# Patient Record
Sex: FEMALE | Race: WHITE | NOT HISPANIC OR LATINO | URBAN - METROPOLITAN AREA
[De-identification: names, ages, dates, MRNs, and addresses within clinical notes are randomized per-mention and may not be internally consistent; named-entity substitution may affect disease eponyms.]

---

## 2018-02-05 ENCOUNTER — INPATIENT (INPATIENT)
Facility: HOSPITAL | Age: 68
LOS: 6 days | Discharge: HOME | DRG: 193 | End: 2018-02-12
Attending: HOSPITALIST | Admitting: HOSPITALIST
Payer: MEDICARE

## 2018-02-05 VITALS
HEIGHT: 68 IN | HEART RATE: 72 BPM | TEMPERATURE: 98 F | WEIGHT: 253.97 LBS | DIASTOLIC BLOOD PRESSURE: 84 MMHG | RESPIRATION RATE: 22 BRPM | SYSTOLIC BLOOD PRESSURE: 133 MMHG | OXYGEN SATURATION: 89 %

## 2018-02-05 DIAGNOSIS — S72.101D UNSPECIFIED TROCHANTERIC FRACTURE OF RIGHT FEMUR, SUBSEQUENT ENCOUNTER FOR CLOSED FRACTURE WITH ROUTINE HEALING: Chronic | ICD-10-CM

## 2018-02-05 DIAGNOSIS — R09.02 HYPOXEMIA: ICD-10-CM

## 2018-02-05 LAB
ALBUMIN SERPL ELPH-MCNC: 4.1 G/DL — SIGNIFICANT CHANGE UP (ref 3.3–5.2)
ALP SERPL-CCNC: 72 U/L — SIGNIFICANT CHANGE UP (ref 40–120)
ALT FLD-CCNC: 31 U/L — SIGNIFICANT CHANGE UP
ANION GAP SERPL CALC-SCNC: 13 MMOL/L — SIGNIFICANT CHANGE UP (ref 5–17)
AST SERPL-CCNC: 25 U/L — SIGNIFICANT CHANGE UP
BASE EXCESS BLDA CALC-SCNC: 7.7 MMOL/L — HIGH (ref -2–2)
BASOPHILS # BLD AUTO: 0 K/UL — SIGNIFICANT CHANGE UP (ref 0–0.2)
BASOPHILS NFR BLD AUTO: 0.1 % — SIGNIFICANT CHANGE UP (ref 0–2)
BILIRUB SERPL-MCNC: 0.3 MG/DL — LOW (ref 0.4–2)
BUN SERPL-MCNC: 8 MG/DL — SIGNIFICANT CHANGE UP (ref 8–20)
CALCIUM SERPL-MCNC: 9.2 MG/DL — SIGNIFICANT CHANGE UP (ref 8.6–10.2)
CHLORIDE SERPL-SCNC: 91 MMOL/L — LOW (ref 98–107)
CO2 SERPL-SCNC: 32 MMOL/L — HIGH (ref 22–29)
CREAT SERPL-MCNC: 0.51 MG/DL — SIGNIFICANT CHANGE UP (ref 0.5–1.3)
D DIMER BLD IA.RAPID-MCNC: 237 NG/ML DDU — HIGH
EOSINOPHIL # BLD AUTO: 0 K/UL — SIGNIFICANT CHANGE UP (ref 0–0.5)
EOSINOPHIL NFR BLD AUTO: 0.1 % — SIGNIFICANT CHANGE UP (ref 0–6)
FLUBV RNA SPEC QL NAA+PROBE: DETECTED
GAS PNL BLDA: SIGNIFICANT CHANGE UP
GLUCOSE SERPL-MCNC: 204 MG/DL — HIGH (ref 70–115)
HCO3 BLDA-SCNC: 31 MMOL/L — HIGH (ref 20–26)
HCT VFR BLD CALC: 46.9 % — SIGNIFICANT CHANGE UP (ref 37–47)
HGB BLD-MCNC: 15 G/DL — SIGNIFICANT CHANGE UP (ref 12–16)
HOROWITZ INDEX BLDA+IHG-RTO: SIGNIFICANT CHANGE UP
LYMPHOCYTES # BLD AUTO: 1.1 K/UL — SIGNIFICANT CHANGE UP (ref 1–4.8)
LYMPHOCYTES # BLD AUTO: 13.9 % — LOW (ref 20–55)
MCHC RBC-ENTMCNC: 27.5 PG — SIGNIFICANT CHANGE UP (ref 27–31)
MCHC RBC-ENTMCNC: 32 G/DL — SIGNIFICANT CHANGE UP (ref 32–36)
MCV RBC AUTO: 85.9 FL — SIGNIFICANT CHANGE UP (ref 81–99)
MONOCYTES # BLD AUTO: 0.6 K/UL — SIGNIFICANT CHANGE UP (ref 0–0.8)
MONOCYTES NFR BLD AUTO: 7.6 % — SIGNIFICANT CHANGE UP (ref 3–10)
NEUTROPHILS # BLD AUTO: 6.3 K/UL — SIGNIFICANT CHANGE UP (ref 1.8–8)
NEUTROPHILS NFR BLD AUTO: 78.2 % — HIGH (ref 37–73)
NT-PROBNP SERPL-SCNC: 562 PG/ML — HIGH (ref 0–300)
PCO2 BLDA: 48 MMHG — HIGH (ref 35–45)
PH BLDA: 7.45 — SIGNIFICANT CHANGE UP (ref 7.35–7.45)
PLATELET # BLD AUTO: 229 K/UL — SIGNIFICANT CHANGE UP (ref 150–400)
PO2 BLDA: 64 MMHG — LOW (ref 83–108)
POTASSIUM SERPL-MCNC: 3.9 MMOL/L — SIGNIFICANT CHANGE UP (ref 3.5–5.3)
POTASSIUM SERPL-SCNC: 3.9 MMOL/L — SIGNIFICANT CHANGE UP (ref 3.5–5.3)
PROT SERPL-MCNC: 8 G/DL — SIGNIFICANT CHANGE UP (ref 6.6–8.7)
RAPID RVP RESULT: DETECTED
RBC # BLD: 5.46 M/UL — HIGH (ref 4.4–5.2)
RBC # FLD: 14.1 % — SIGNIFICANT CHANGE UP (ref 11–15.6)
SAO2 % BLDA: 94 % — LOW (ref 95–99)
SODIUM SERPL-SCNC: 136 MMOL/L — SIGNIFICANT CHANGE UP (ref 135–145)
TROPONIN T SERPL-MCNC: <0.01 NG/ML — SIGNIFICANT CHANGE UP (ref 0–0.06)
WBC # BLD: 8 K/UL — SIGNIFICANT CHANGE UP (ref 4.8–10.8)
WBC # FLD AUTO: 8 K/UL — SIGNIFICANT CHANGE UP (ref 4.8–10.8)

## 2018-02-05 PROCEDURE — 71275 CT ANGIOGRAPHY CHEST: CPT | Mod: 26

## 2018-02-05 PROCEDURE — 99285 EMERGENCY DEPT VISIT HI MDM: CPT

## 2018-02-05 PROCEDURE — 99223 1ST HOSP IP/OBS HIGH 75: CPT

## 2018-02-05 RX ORDER — AZITHROMYCIN 500 MG/1
TABLET, FILM COATED ORAL
Qty: 0 | Refills: 0 | Status: DISCONTINUED | OUTPATIENT
Start: 2018-02-05 | End: 2018-02-07

## 2018-02-05 RX ORDER — IPRATROPIUM/ALBUTEROL SULFATE 18-103MCG
3 AEROSOL WITH ADAPTER (GRAM) INHALATION ONCE
Qty: 0 | Refills: 0 | Status: COMPLETED | OUTPATIENT
Start: 2018-02-05 | End: 2018-02-05

## 2018-02-05 RX ORDER — LOSARTAN POTASSIUM 100 MG/1
100 TABLET, FILM COATED ORAL DAILY
Qty: 0 | Refills: 0 | Status: DISCONTINUED | OUTPATIENT
Start: 2018-02-05 | End: 2018-02-12

## 2018-02-05 RX ORDER — INSULIN LISPRO 100/ML
VIAL (ML) SUBCUTANEOUS
Qty: 0 | Refills: 0 | Status: DISCONTINUED | OUTPATIENT
Start: 2018-02-05 | End: 2018-02-05

## 2018-02-05 RX ORDER — GLUCAGON INJECTION, SOLUTION 0.5 MG/.1ML
1 INJECTION, SOLUTION SUBCUTANEOUS ONCE
Qty: 0 | Refills: 0 | Status: DISCONTINUED | OUTPATIENT
Start: 2018-02-05 | End: 2018-02-12

## 2018-02-05 RX ORDER — INSULIN DETEMIR 100/ML (3)
62 INSULIN PEN (ML) SUBCUTANEOUS
Qty: 0 | Refills: 0 | Status: DISCONTINUED | OUTPATIENT
Start: 2018-02-05 | End: 2018-02-07

## 2018-02-05 RX ORDER — FLUOXETINE HCL 10 MG
60 CAPSULE ORAL DAILY
Qty: 0 | Refills: 0 | Status: DISCONTINUED | OUTPATIENT
Start: 2018-02-05 | End: 2018-02-12

## 2018-02-05 RX ORDER — ATENOLOL 25 MG/1
0 TABLET ORAL
Qty: 0 | Refills: 0 | COMMUNITY

## 2018-02-05 RX ORDER — ZOLPIDEM TARTRATE 10 MG/1
10 TABLET ORAL AT BEDTIME
Qty: 0 | Refills: 0 | Status: DISCONTINUED | OUTPATIENT
Start: 2018-02-05 | End: 2018-02-11

## 2018-02-05 RX ORDER — AZITHROMYCIN 500 MG/1
500 TABLET, FILM COATED ORAL ONCE
Qty: 0 | Refills: 0 | Status: COMPLETED | OUTPATIENT
Start: 2018-02-05 | End: 2018-02-05

## 2018-02-05 RX ORDER — RANITIDINE HYDROCHLORIDE 150 MG/1
0 TABLET, FILM COATED ORAL
Qty: 0 | Refills: 0 | COMMUNITY

## 2018-02-05 RX ORDER — DEXTROSE 50 % IN WATER 50 %
25 SYRINGE (ML) INTRAVENOUS ONCE
Qty: 0 | Refills: 0 | Status: DISCONTINUED | OUTPATIENT
Start: 2018-02-05 | End: 2018-02-12

## 2018-02-05 RX ORDER — DEXTROSE 50 % IN WATER 50 %
1 SYRINGE (ML) INTRAVENOUS ONCE
Qty: 0 | Refills: 0 | Status: DISCONTINUED | OUTPATIENT
Start: 2018-02-05 | End: 2018-02-12

## 2018-02-05 RX ORDER — ALBUTEROL 90 UG/1
2.5 AEROSOL, METERED ORAL ONCE
Qty: 0 | Refills: 0 | Status: COMPLETED | OUTPATIENT
Start: 2018-02-05 | End: 2018-02-05

## 2018-02-05 RX ORDER — HYDROCHLOROTHIAZIDE 25 MG
25 TABLET ORAL DAILY
Qty: 0 | Refills: 0 | Status: DISCONTINUED | OUTPATIENT
Start: 2018-02-05 | End: 2018-02-06

## 2018-02-05 RX ORDER — SIMVASTATIN 20 MG/1
20 TABLET, FILM COATED ORAL AT BEDTIME
Qty: 0 | Refills: 0 | Status: DISCONTINUED | OUTPATIENT
Start: 2018-02-05 | End: 2018-02-12

## 2018-02-05 RX ORDER — ALPRAZOLAM 0.25 MG
1 TABLET ORAL
Qty: 0 | Refills: 0 | Status: DISCONTINUED | OUTPATIENT
Start: 2018-02-05 | End: 2018-02-06

## 2018-02-05 RX ORDER — DEXTROSE 50 % IN WATER 50 %
12.5 SYRINGE (ML) INTRAVENOUS ONCE
Qty: 0 | Refills: 0 | Status: DISCONTINUED | OUTPATIENT
Start: 2018-02-05 | End: 2018-02-12

## 2018-02-05 RX ORDER — CARVEDILOL PHOSPHATE 80 MG/1
6.25 CAPSULE, EXTENDED RELEASE ORAL EVERY 12 HOURS
Qty: 0 | Refills: 0 | Status: DISCONTINUED | OUTPATIENT
Start: 2018-02-05 | End: 2018-02-10

## 2018-02-05 RX ORDER — AZITHROMYCIN 500 MG/1
250 TABLET, FILM COATED ORAL EVERY 24 HOURS
Qty: 0 | Refills: 0 | Status: DISCONTINUED | OUTPATIENT
Start: 2018-02-06 | End: 2018-02-07

## 2018-02-05 RX ORDER — ACETAMINOPHEN 500 MG
650 TABLET ORAL EVERY 6 HOURS
Qty: 0 | Refills: 0 | Status: DISCONTINUED | OUTPATIENT
Start: 2018-02-05 | End: 2018-02-12

## 2018-02-05 RX ORDER — SODIUM CHLORIDE 9 MG/ML
1000 INJECTION INTRAMUSCULAR; INTRAVENOUS; SUBCUTANEOUS
Qty: 0 | Refills: 0 | Status: DISCONTINUED | OUTPATIENT
Start: 2018-02-05 | End: 2018-02-07

## 2018-02-05 RX ORDER — AMLODIPINE BESYLATE 2.5 MG/1
5 TABLET ORAL DAILY
Qty: 0 | Refills: 0 | Status: DISCONTINUED | OUTPATIENT
Start: 2018-02-05 | End: 2018-02-08

## 2018-02-05 RX ORDER — IPRATROPIUM/ALBUTEROL SULFATE 18-103MCG
3 AEROSOL WITH ADAPTER (GRAM) INHALATION EVERY 6 HOURS
Qty: 0 | Refills: 0 | Status: DISCONTINUED | OUTPATIENT
Start: 2018-02-05 | End: 2018-02-07

## 2018-02-05 RX ORDER — SODIUM CHLORIDE 9 MG/ML
1000 INJECTION, SOLUTION INTRAVENOUS
Qty: 0 | Refills: 0 | Status: DISCONTINUED | OUTPATIENT
Start: 2018-02-05 | End: 2018-02-12

## 2018-02-05 RX ADMIN — Medication 3 MILLILITER(S): at 14:40

## 2018-02-05 RX ADMIN — AZITHROMYCIN 500 MILLIGRAM(S): 500 TABLET, FILM COATED ORAL at 21:50

## 2018-02-05 RX ADMIN — ALBUTEROL 2.5 MILLIGRAM(S): 90 AEROSOL, METERED ORAL at 14:49

## 2018-02-05 RX ADMIN — Medication 125 MILLIGRAM(S): at 14:48

## 2018-02-05 NOTE — ED ADULT NURSE NOTE - OBJECTIVE STATEMENT
Late entry : Pt presents  to ED c/o increased SOB over couple of days, denies chest pain , denies fever. Pt was send from PMD for further eval

## 2018-02-05 NOTE — H&P ADULT - PROBLEM SELECTOR PLAN 1
with hypoxia due to COPD exacerbation  Admit to any bed with   Nebs q 6 hrs  EKG  troponin 0.01  RVP+ for influenza b  solumedrol 125 IV then 40 q 6 with plan for taper  NC prn SOB for goal >92%  cbc, cmp, mg, phos, coags for AM  ABG reviewed

## 2018-02-05 NOTE — ED PROVIDER NOTE - OBJECTIVE STATEMENT
66 yo obese female smoker comes to ed with low oxygen saturation 88- 89 on ra; pt sent for further work up ;
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
## 2018-02-05 NOTE — H&P ADULT - ASSESSMENT
68 yo female with a pmh/o HTN, HLD, who is a current smoker who is admitted for respiratory distress with hypoxia due to COPD exacerbation likely caused by Influenza B infection

## 2018-02-05 NOTE — ED ADULT NURSE NOTE - CHIEF COMPLAINT QUOTE
pt c/o shortness of breath with cough congestion, md sent over for low pulse ox (89) pt wheezing, sent  to main

## 2018-02-05 NOTE — H&P ADULT - NSHPSOCIALHISTORY_GEN_ALL_CORE
, works in dance studio as volunteer, smoker, no etoh/drug use  lives with daughter and grand daughter

## 2018-02-05 NOTE — H&P ADULT - PMH
DM (diabetes mellitus)    High cholesterol    HTN (hypertension) DM (diabetes mellitus)    High cholesterol    HTN (hypertension)    Smoker

## 2018-02-05 NOTE — H&P ADULT - PROBLEM SELECTOR PLAN 3
paratracheal in setting of smoking history  likely reactive to acute infection however will need to f/u with yearly low dose CT scan with PMD
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
## 2018-02-05 NOTE — ED ADULT TRIAGE NOTE - CHIEF COMPLAINT QUOTE
pt c/o shortness of breath with cough congestion, md sent over for low pulse ox (89) pt wheezing pt c/o shortness of breath with cough congestion, md sent over for low pulse ox (89) pt wheezing, sent  to main

## 2018-02-06 DIAGNOSIS — J11.1 INFLUENZA DUE TO UNIDENTIFIED INFLUENZA VIRUS WITH OTHER RESPIRATORY MANIFESTATIONS: ICD-10-CM

## 2018-02-06 DIAGNOSIS — I10 ESSENTIAL (PRIMARY) HYPERTENSION: ICD-10-CM

## 2018-02-06 DIAGNOSIS — J44.1 CHRONIC OBSTRUCTIVE PULMONARY DISEASE WITH (ACUTE) EXACERBATION: ICD-10-CM

## 2018-02-06 DIAGNOSIS — J10.1 INFLUENZA DUE TO OTHER IDENTIFIED INFLUENZA VIRUS WITH OTHER RESPIRATORY MANIFESTATIONS: ICD-10-CM

## 2018-02-06 DIAGNOSIS — E78.00 PURE HYPERCHOLESTEROLEMIA, UNSPECIFIED: ICD-10-CM

## 2018-02-06 DIAGNOSIS — F17.200 NICOTINE DEPENDENCE, UNSPECIFIED, UNCOMPLICATED: ICD-10-CM

## 2018-02-06 DIAGNOSIS — R06.03 ACUTE RESPIRATORY DISTRESS: ICD-10-CM

## 2018-02-06 DIAGNOSIS — R59.1 GENERALIZED ENLARGED LYMPH NODES: ICD-10-CM

## 2018-02-06 DIAGNOSIS — E11.9 TYPE 2 DIABETES MELLITUS WITHOUT COMPLICATIONS: ICD-10-CM

## 2018-02-06 LAB
ALBUMIN SERPL ELPH-MCNC: 3.9 G/DL — SIGNIFICANT CHANGE UP (ref 3.3–5.2)
ALP SERPL-CCNC: 61 U/L — SIGNIFICANT CHANGE UP (ref 40–120)
ALT FLD-CCNC: 27 U/L — SIGNIFICANT CHANGE UP
ANION GAP SERPL CALC-SCNC: 15 MMOL/L — SIGNIFICANT CHANGE UP (ref 5–17)
APPEARANCE UR: CLEAR — SIGNIFICANT CHANGE UP
APTT BLD: 30.2 SEC — SIGNIFICANT CHANGE UP (ref 27.5–37.4)
AST SERPL-CCNC: 17 U/L — SIGNIFICANT CHANGE UP
BILIRUB SERPL-MCNC: 0.3 MG/DL — LOW (ref 0.4–2)
BILIRUB UR-MCNC: NEGATIVE — SIGNIFICANT CHANGE UP
BUN SERPL-MCNC: 12 MG/DL — SIGNIFICANT CHANGE UP (ref 8–20)
CALCIUM SERPL-MCNC: 8.9 MG/DL — SIGNIFICANT CHANGE UP (ref 8.6–10.2)
CHLORIDE SERPL-SCNC: 94 MMOL/L — LOW (ref 98–107)
CO2 SERPL-SCNC: 30 MMOL/L — HIGH (ref 22–29)
COLOR SPEC: YELLOW — SIGNIFICANT CHANGE UP
CREAT SERPL-MCNC: 0.5 MG/DL — SIGNIFICANT CHANGE UP (ref 0.5–1.3)
DIFF PNL FLD: ABNORMAL
EPI CELLS # UR: SIGNIFICANT CHANGE UP
GLUCOSE BLDC GLUCOMTR-MCNC: 250 MG/DL — HIGH (ref 70–99)
GLUCOSE BLDC GLUCOMTR-MCNC: 264 MG/DL — HIGH (ref 70–99)
GLUCOSE BLDC GLUCOMTR-MCNC: 283 MG/DL — HIGH (ref 70–99)
GLUCOSE BLDC GLUCOMTR-MCNC: 342 MG/DL — HIGH (ref 70–99)
GLUCOSE BLDC GLUCOMTR-MCNC: 348 MG/DL — HIGH (ref 70–99)
GLUCOSE BLDC GLUCOMTR-MCNC: 382 MG/DL — HIGH (ref 70–99)
GLUCOSE SERPL-MCNC: 294 MG/DL — HIGH (ref 70–115)
GLUCOSE UR QL: 1000 MG/DL
HBA1C BLD-MCNC: 9.4 % — HIGH (ref 4–5.6)
HCT VFR BLD CALC: 45.6 % — SIGNIFICANT CHANGE UP (ref 37–47)
HGB BLD-MCNC: 14.3 G/DL — SIGNIFICANT CHANGE UP (ref 12–16)
INR BLD: 0.98 RATIO — SIGNIFICANT CHANGE UP (ref 0.88–1.16)
KETONES UR-MCNC: ABNORMAL
LEUKOCYTE ESTERASE UR-ACNC: NEGATIVE — SIGNIFICANT CHANGE UP
LYMPHOCYTES # BLD AUTO: 0.6 K/UL — LOW (ref 1–4.8)
LYMPHOCYTES # BLD AUTO: 7 % — LOW (ref 20–55)
MAGNESIUM SERPL-MCNC: 2.1 MG/DL — SIGNIFICANT CHANGE UP (ref 1.6–2.6)
MCHC RBC-ENTMCNC: 27.2 PG — SIGNIFICANT CHANGE UP (ref 27–31)
MCHC RBC-ENTMCNC: 31.4 G/DL — LOW (ref 32–36)
MCV RBC AUTO: 86.7 FL — SIGNIFICANT CHANGE UP (ref 81–99)
MONOCYTES # BLD AUTO: 0.5 K/UL — SIGNIFICANT CHANGE UP (ref 0–0.8)
MONOCYTES NFR BLD AUTO: 6 % — SIGNIFICANT CHANGE UP (ref 3–10)
NEUTROPHILS # BLD AUTO: 7.8 K/UL — SIGNIFICANT CHANGE UP (ref 1.8–8)
NEUTROPHILS NFR BLD AUTO: 83 % — HIGH (ref 37–73)
NEUTS BAND # BLD: 4 % — SIGNIFICANT CHANGE UP (ref 0–8)
NITRITE UR-MCNC: NEGATIVE — SIGNIFICANT CHANGE UP
PH UR: 6 — SIGNIFICANT CHANGE UP (ref 5–8)
PHOSPHATE SERPL-MCNC: 2.2 MG/DL — LOW (ref 2.4–4.7)
PLAT MORPH BLD: NORMAL — SIGNIFICANT CHANGE UP
PLATELET # BLD AUTO: 216 K/UL — SIGNIFICANT CHANGE UP (ref 150–400)
POTASSIUM SERPL-MCNC: 4 MMOL/L — SIGNIFICANT CHANGE UP (ref 3.5–5.3)
POTASSIUM SERPL-SCNC: 4 MMOL/L — SIGNIFICANT CHANGE UP (ref 3.5–5.3)
PROT SERPL-MCNC: 7.4 G/DL — SIGNIFICANT CHANGE UP (ref 6.6–8.7)
PROT UR-MCNC: 100 MG/DL
PROTHROM AB SERPL-ACNC: 10.8 SEC — SIGNIFICANT CHANGE UP (ref 9.8–12.7)
RBC # BLD: 5.26 M/UL — HIGH (ref 4.4–5.2)
RBC # FLD: 14.1 % — SIGNIFICANT CHANGE UP (ref 11–15.6)
RBC BLD AUTO: NORMAL — SIGNIFICANT CHANGE UP
RBC CASTS # UR COMP ASSIST: SIGNIFICANT CHANGE UP /HPF (ref 0–4)
SODIUM SERPL-SCNC: 139 MMOL/L — SIGNIFICANT CHANGE UP (ref 135–145)
SP GR SPEC: 1.01 — SIGNIFICANT CHANGE UP (ref 1.01–1.02)
UROBILINOGEN FLD QL: NEGATIVE MG/DL — SIGNIFICANT CHANGE UP
WBC # BLD: 8.9 K/UL — SIGNIFICANT CHANGE UP (ref 4.8–10.8)
WBC # FLD AUTO: 8.9 K/UL — SIGNIFICANT CHANGE UP (ref 4.8–10.8)
WBC UR QL: NEGATIVE — SIGNIFICANT CHANGE UP

## 2018-02-06 PROCEDURE — 99222 1ST HOSP IP/OBS MODERATE 55: CPT

## 2018-02-06 PROCEDURE — 99233 SBSQ HOSP IP/OBS HIGH 50: CPT

## 2018-02-06 PROCEDURE — 93010 ELECTROCARDIOGRAM REPORT: CPT

## 2018-02-06 RX ORDER — CEFTRIAXONE 500 MG/1
1 INJECTION, POWDER, FOR SOLUTION INTRAMUSCULAR; INTRAVENOUS EVERY 24 HOURS
Qty: 0 | Refills: 0 | Status: DISCONTINUED | OUTPATIENT
Start: 2018-02-06 | End: 2018-02-07

## 2018-02-06 RX ORDER — ALPRAZOLAM 0.25 MG
0 TABLET ORAL
Qty: 0 | Refills: 0 | COMMUNITY

## 2018-02-06 RX ORDER — METFORMIN HYDROCHLORIDE 850 MG/1
1 TABLET ORAL
Qty: 0 | Refills: 0 | COMMUNITY

## 2018-02-06 RX ORDER — ALPRAZOLAM 0.25 MG
0.5 TABLET ORAL
Qty: 0 | Refills: 0 | Status: DISCONTINUED | OUTPATIENT
Start: 2018-02-06 | End: 2018-02-12

## 2018-02-06 RX ORDER — INSULIN LISPRO 100/ML
VIAL (ML) SUBCUTANEOUS
Qty: 0 | Refills: 0 | Status: DISCONTINUED | OUTPATIENT
Start: 2018-02-06 | End: 2018-02-12

## 2018-02-06 RX ORDER — FLUOXETINE HCL 10 MG
60 CAPSULE ORAL
Qty: 0 | Refills: 0 | COMMUNITY

## 2018-02-06 RX ORDER — INSULIN LISPRO 100/ML
15 VIAL (ML) SUBCUTANEOUS
Qty: 0 | Refills: 0 | Status: DISCONTINUED | OUTPATIENT
Start: 2018-02-06 | End: 2018-02-07

## 2018-02-06 RX ORDER — INSULIN LISPRO 100/ML
VIAL (ML) SUBCUTANEOUS
Qty: 0 | Refills: 0 | Status: DISCONTINUED | OUTPATIENT
Start: 2018-02-05 | End: 2018-02-06

## 2018-02-06 RX ORDER — SACCHAROMYCES BOULARDII 250 MG
250 POWDER IN PACKET (EA) ORAL
Qty: 0 | Refills: 0 | Status: DISCONTINUED | OUTPATIENT
Start: 2018-02-06 | End: 2018-02-07

## 2018-02-06 RX ADMIN — Medication 0.5 MILLIGRAM(S): at 20:54

## 2018-02-06 RX ADMIN — AMLODIPINE BESYLATE 5 MILLIGRAM(S): 2.5 TABLET ORAL at 05:02

## 2018-02-06 RX ADMIN — Medication 75 MILLIGRAM(S): at 17:20

## 2018-02-06 RX ADMIN — Medication 40 MILLIGRAM(S): at 09:31

## 2018-02-06 RX ADMIN — CEFTRIAXONE 100 GRAM(S): 500 INJECTION, POWDER, FOR SOLUTION INTRAMUSCULAR; INTRAVENOUS at 20:46

## 2018-02-06 RX ADMIN — Medication 12: at 17:22

## 2018-02-06 RX ADMIN — Medication 3 MILLILITER(S): at 20:19

## 2018-02-06 RX ADMIN — Medication 62 UNIT(S): at 09:30

## 2018-02-06 RX ADMIN — Medication 3 MILLILITER(S): at 08:21

## 2018-02-06 RX ADMIN — LOSARTAN POTASSIUM 100 MILLIGRAM(S): 100 TABLET, FILM COATED ORAL at 05:02

## 2018-02-06 RX ADMIN — Medication 25 MILLIGRAM(S): at 05:02

## 2018-02-06 RX ADMIN — Medication 75 MILLIGRAM(S): at 05:02

## 2018-02-06 RX ADMIN — Medication 40 MILLIGRAM(S): at 03:16

## 2018-02-06 RX ADMIN — Medication 60 MILLIGRAM(S): at 12:25

## 2018-02-06 RX ADMIN — AZITHROMYCIN 250 MILLIGRAM(S): 500 TABLET, FILM COATED ORAL at 20:53

## 2018-02-06 RX ADMIN — Medication 40 MILLIGRAM(S): at 17:20

## 2018-02-06 RX ADMIN — Medication 40 MILLIGRAM(S): at 12:24

## 2018-02-06 RX ADMIN — Medication 3 MILLILITER(S): at 15:26

## 2018-02-06 RX ADMIN — Medication 62 UNIT(S): at 20:54

## 2018-02-06 RX ADMIN — Medication 15 UNIT(S): at 17:22

## 2018-02-06 RX ADMIN — Medication 17: at 09:30

## 2018-02-06 RX ADMIN — Medication 250 MILLIGRAM(S): at 17:20

## 2018-02-06 RX ADMIN — CARVEDILOL PHOSPHATE 6.25 MILLIGRAM(S): 80 CAPSULE, EXTENDED RELEASE ORAL at 05:01

## 2018-02-06 RX ADMIN — SODIUM CHLORIDE 125 MILLILITER(S): 9 INJECTION INTRAMUSCULAR; INTRAVENOUS; SUBCUTANEOUS at 05:02

## 2018-02-06 RX ADMIN — CARVEDILOL PHOSPHATE 6.25 MILLIGRAM(S): 80 CAPSULE, EXTENDED RELEASE ORAL at 17:20

## 2018-02-06 RX ADMIN — Medication 3 MILLILITER(S): at 03:24

## 2018-02-06 RX ADMIN — Medication 17: at 03:27

## 2018-02-06 RX ADMIN — SODIUM CHLORIDE 125 MILLILITER(S): 9 INJECTION INTRAMUSCULAR; INTRAVENOUS; SUBCUTANEOUS at 17:24

## 2018-02-06 RX ADMIN — Medication 17: at 12:24

## 2018-02-06 RX ADMIN — SIMVASTATIN 20 MILLIGRAM(S): 20 TABLET, FILM COATED ORAL at 21:02

## 2018-02-06 RX ADMIN — Medication 62.5 MILLIMOLE(S): at 09:32

## 2018-02-06 RX ADMIN — Medication 40 MILLIGRAM(S): at 23:48

## 2018-02-06 NOTE — PROGRESS NOTE ADULT - ASSESSMENT
67 y/ o female with PMHx of HTN, HLD, DM-II, active smoker, presented to ED with SOB, cough, headache, malaise and noted to have positive influenza and wheezing on exam, admitted for respiratory distress with hypoxia due to presumed COPD exacerbation given smoking h/o in setting of Influenza B infection.

## 2018-02-06 NOTE — PROGRESS NOTE ADULT - PROBLEM SELECTOR PLAN 7
Pt on Levemir 62 bid and high sliding scale at home.   BG uncontrolled, A1C 9+  cont levemir 62 bid, add premeal insulin and high SSL  POC qAC/HS

## 2018-02-07 LAB
GLUCOSE BLDC GLUCOMTR-MCNC: 174 MG/DL — HIGH (ref 70–99)
GLUCOSE BLDC GLUCOMTR-MCNC: 268 MG/DL — HIGH (ref 70–99)
GLUCOSE BLDC GLUCOMTR-MCNC: 328 MG/DL — HIGH (ref 70–99)
GLUCOSE BLDC GLUCOMTR-MCNC: 330 MG/DL — HIGH (ref 70–99)

## 2018-02-07 PROCEDURE — 99233 SBSQ HOSP IP/OBS HIGH 50: CPT

## 2018-02-07 RX ORDER — ENOXAPARIN SODIUM 100 MG/ML
40 INJECTION SUBCUTANEOUS DAILY
Qty: 0 | Refills: 0 | Status: DISCONTINUED | OUTPATIENT
Start: 2018-02-07 | End: 2018-02-12

## 2018-02-07 RX ORDER — HYDROCHLOROTHIAZIDE 25 MG
25 TABLET ORAL ONCE
Qty: 0 | Refills: 0 | Status: COMPLETED | OUTPATIENT
Start: 2018-02-07 | End: 2018-02-07

## 2018-02-07 RX ORDER — INSULIN LISPRO 100/ML
20 VIAL (ML) SUBCUTANEOUS
Qty: 0 | Refills: 0 | Status: DISCONTINUED | OUTPATIENT
Start: 2018-02-07 | End: 2018-02-12

## 2018-02-07 RX ORDER — INSULIN LISPRO 100/ML
0 VIAL (ML) SUBCUTANEOUS
Qty: 0 | Refills: 0 | COMMUNITY

## 2018-02-07 RX ORDER — INSULIN GLARGINE 100 [IU]/ML
60 INJECTION, SOLUTION SUBCUTANEOUS EVERY MORNING
Qty: 0 | Refills: 0 | Status: DISCONTINUED | OUTPATIENT
Start: 2018-02-07 | End: 2018-02-12

## 2018-02-07 RX ORDER — HYDROCHLOROTHIAZIDE 25 MG
25 TABLET ORAL DAILY
Qty: 0 | Refills: 0 | Status: DISCONTINUED | OUTPATIENT
Start: 2018-02-08 | End: 2018-02-12

## 2018-02-07 RX ORDER — IPRATROPIUM/ALBUTEROL SULFATE 18-103MCG
3 AEROSOL WITH ADAPTER (GRAM) INHALATION EVERY 4 HOURS
Qty: 0 | Refills: 0 | Status: DISCONTINUED | OUTPATIENT
Start: 2018-02-07 | End: 2018-02-07

## 2018-02-07 RX ORDER — IPRATROPIUM/ALBUTEROL SULFATE 18-103MCG
3 AEROSOL WITH ADAPTER (GRAM) INHALATION EVERY 4 HOURS
Qty: 0 | Refills: 0 | Status: DISCONTINUED | OUTPATIENT
Start: 2018-02-07 | End: 2018-02-10

## 2018-02-07 RX ORDER — INSULIN GLARGINE 100 [IU]/ML
60 INJECTION, SOLUTION SUBCUTANEOUS AT BEDTIME
Qty: 0 | Refills: 0 | Status: DISCONTINUED | OUTPATIENT
Start: 2018-02-07 | End: 2018-02-12

## 2018-02-07 RX ADMIN — Medication 62 UNIT(S): at 09:15

## 2018-02-07 RX ADMIN — Medication 75 MILLIGRAM(S): at 05:59

## 2018-02-07 RX ADMIN — CARVEDILOL PHOSPHATE 6.25 MILLIGRAM(S): 80 CAPSULE, EXTENDED RELEASE ORAL at 05:59

## 2018-02-07 RX ADMIN — Medication 40 MILLIGRAM(S): at 05:59

## 2018-02-07 RX ADMIN — Medication 3 MILLILITER(S): at 20:10

## 2018-02-07 RX ADMIN — ENOXAPARIN SODIUM 40 MILLIGRAM(S): 100 INJECTION SUBCUTANEOUS at 12:36

## 2018-02-07 RX ADMIN — Medication 40 MILLIGRAM(S): at 22:29

## 2018-02-07 RX ADMIN — SIMVASTATIN 20 MILLIGRAM(S): 20 TABLET, FILM COATED ORAL at 22:29

## 2018-02-07 RX ADMIN — Medication 3 MILLILITER(S): at 03:31

## 2018-02-07 RX ADMIN — CARVEDILOL PHOSPHATE 6.25 MILLIGRAM(S): 80 CAPSULE, EXTENDED RELEASE ORAL at 18:09

## 2018-02-07 RX ADMIN — Medication 250 MILLIGRAM(S): at 05:59

## 2018-02-07 RX ADMIN — Medication 15 UNIT(S): at 12:37

## 2018-02-07 RX ADMIN — ZOLPIDEM TARTRATE 10 MILLIGRAM(S): 10 TABLET ORAL at 00:50

## 2018-02-07 RX ADMIN — Medication 60 MILLIGRAM(S): at 12:37

## 2018-02-07 RX ADMIN — Medication 16: at 18:08

## 2018-02-07 RX ADMIN — SODIUM CHLORIDE 125 MILLILITER(S): 9 INJECTION INTRAMUSCULAR; INTRAVENOUS; SUBCUTANEOUS at 00:50

## 2018-02-07 RX ADMIN — Medication 25 MILLIGRAM(S): at 12:36

## 2018-02-07 RX ADMIN — Medication 3 MILLILITER(S): at 09:11

## 2018-02-07 RX ADMIN — INSULIN GLARGINE 60 UNIT(S): 100 INJECTION, SOLUTION SUBCUTANEOUS at 22:28

## 2018-02-07 RX ADMIN — Medication 40 MILLIGRAM(S): at 12:35

## 2018-02-07 RX ADMIN — Medication 20 UNIT(S): at 18:08

## 2018-02-07 RX ADMIN — Medication 15 UNIT(S): at 09:14

## 2018-02-07 RX ADMIN — Medication 16: at 12:38

## 2018-02-07 RX ADMIN — Medication 4: at 09:15

## 2018-02-07 RX ADMIN — Medication 75 MILLIGRAM(S): at 18:09

## 2018-02-07 RX ADMIN — Medication 3 MILLILITER(S): at 13:25

## 2018-02-07 RX ADMIN — AMLODIPINE BESYLATE 5 MILLIGRAM(S): 2.5 TABLET ORAL at 05:59

## 2018-02-07 RX ADMIN — Medication 3 MILLILITER(S): at 15:40

## 2018-02-07 RX ADMIN — LOSARTAN POTASSIUM 100 MILLIGRAM(S): 100 TABLET, FILM COATED ORAL at 05:59

## 2018-02-07 RX ADMIN — SODIUM CHLORIDE 125 MILLILITER(S): 9 INJECTION INTRAMUSCULAR; INTRAVENOUS; SUBCUTANEOUS at 12:34

## 2018-02-07 NOTE — CONSULT NOTE ADULT - ASSESSMENT
DM type 2, insulin treated, chronically uncontrolled and likely to be problematic acutely due to corticosteroids.   Pt. will likely require large amounts of prandial insulin. As obesity a concern, pt. may do well with outpatient victoza use.  Will follow
68 yo lady, longterm smoker with 3-4 days of progressive dyspnea at rest, wheezing, yellow sputum who has been found to have Influenza B    Hx, obesity,  HTN on meds  Diabetes on oral agents and insulin    Lives with daughter and grandaughter who are not sick.    No definite pneumonitis on her CXR  but she has yellow sputum.  Likely COPD with wheezing, mild hypercapnea and long smoking history    Agree with management including Tamiflu, antibiotics (rocephin), steroids and nebulization.    SMOKING CESSATION is essential    Pt of Sara Ascencio. I will see her in office if she requests.

## 2018-02-07 NOTE — PROGRESS NOTE ADULT - ASSESSMENT
-Likely AECOPD  -Influenza B  -Hypoxia  -Obesity  -Wheeze    RECC:   IV steroids; with taper. Tamiflu. Nebs. O2. Weight loss. Outpt PFTs.

## 2018-02-07 NOTE — PROGRESS NOTE ADULT - ASSESSMENT
67 y/ o female with PMHx of HTN, HLD, DM-II, active smoker, presented to ED with SOB, cough, headache, malaise and noted to have positive influenza and wheezing on exam, admitted for respiratory distress with hypoxia due to presumed COPD exacerbation given smoking h/o in setting of Influenza B infection.      Problem/Plan - 1:  ·  Problem: Respiratory distress.  Plan: with hypoxia likely due to presumed COPD exacerbation  C/w Zithromax, Nebs q 6 hrs, solumedrol 40 q6 for today, prn SOB for goal >92%  Pulmonology consult- will need to establish outpatient f/u and PFT- never seen pulmonologist in past.      Problem/Plan - 2:  ·  Problem: Flu.  Plan: Influenza B positive  supportive care, Tamiflu and isolation measures and precautions.      Problem/Plan - 3:  ·  Problem: Lymphadenopathy.  Plan: Paratracheal in setting of smoking history, likely reactive to acute infection however will need to outpatient f/u with yearly low dose CT scan with PMD.      Problem/Plan - 4:  ·  Problem: Smoker.  Plan: cessation counseling provided  refused nicotine patch.      Problem/Plan - 5:  ·  Problem: High cholesterol.  Plan: cont zocor  DASH/TLC diet.      Problem/Plan - 6:  Problem: Essential hypertension. Plan: cont losartan / amlodipine / coreg.  Daughter to bring home meds list to verify.     Problem/Plan - 7:  ·  Problem: Type 2 diabetes mellitus without complication, with long-term current use of insulin.  Plan: Pt on Levemir 62 bid and high sliding scale at home.   BG uncontrolled, A1C 9+  cont levemir 62 bid, add premeal insulin and high SSL  POC qAC/HS.   Diabetes Educator consult, Nutrition consult, Endocrine consult. 67 y/ o female with PMHx of HTN, HLD, DM-II, active smoker, presented to ED with SOB, cough, headache, malaise and noted to have positive influenza and wheezing on exam, admitted for respiratory distress with hypoxia due to presumed COPD exacerbation given smoking h/o in setting of Influenza B infection.      Problem/Plan - 1:  ·  Problem: Respiratory distress.  Plan: with hypoxia likely due to presumed COPD exacerbation (undiagnosed ) with +influenza   Nebs q 4hrs, while awake, solumedrol 40 changed to q 8 hrs for goal >92%  Pulmonology input appreciated, will dc antibiotics, will assess for need for home oxygen, will need nebulizer,      Problem/Plan - 2:  ·  Problem: Flu.  Plan: Influenza B positive  supportive care, Tamiflu and isolation measures and precautions.      Problem/Plan - 3:  ·  Problem: Lymphadenopathy.  Plan: Paratracheal in setting of smoking history, likely reactive to acute infection however will need to outpatient f/u with yearly low dose CT scan with PMD.      Problem/Plan - 4:  ·  Problem: Smoker.  Plan: cessation counseling provided  refused nicotine patch.      Problem/Plan - 5:  ·  Problem: High cholesterol.  Plan: cont zocor  DASH/TLC diet.      Problem/Plan - 6:  Problem: Essential hypertension. Plan: cont losartan / amlodipine / coreg.  Daughter brought in medication list, on 2 BB and ACE and ARB? Will optimize RX, resume HCTZ, if remains elevated will increase Norvasc to 10 mg     Problem/Plan - 7:  ·  Problem: Type 2 diabetes mellitus without complication, with long-term current use of insulin.  Plan: Pt on Levemir 62 bid and high sliding scale at home.   BG uncontrolled, A1C 9+ BG elevated due to steroids, also food brought from home to patient  cont levemir 62 bid, add premeal insulin and high SSL  POC qAC/HS.   Diabetes Educator consult, Nutrition consult, Endocrine consult.     Problem/Plan-8  Problem: Depression. Plan: As per daughter has not seen Psychiatrist or therapist, on 2 SSRI's? Psych consulted for optimization of medication    Problem/Plan-9  Problem: DVT ppx. Plan: Lovenox 67 y/ o female with PMHx of HTN, HLD, DM-II, active smoker, presented to ED with SOB, cough, headache, malaise and noted to have positive influenza and wheezing on exam, admitted for respiratory distress with hypoxia due to presumed COPD exacerbation given smoking h/o in setting of Influenza B infection.     Plan:    Acute Respiratory failure with hypoxia:   - Likely due to presumed COPD exacerbation (undiagnosed ) with +influenza  - C/w Nebs q 4hrs, while awake, solumedrol 40 changed to q 8 hrs for goal >92%  - Pulmonology input appreciated--> will dc antibiotics, will assess for need for home oxygen, will need nebulizer,     Flu Influenza B positive:  supportive care, Tamiflu and isolation measures and precautions.     Lymphadenopathy.    - Paratracheal in setting of smoking history, likely reactive to acute infection however will need to outpatient f/u with yearly low dose CT scan with PMD.     Smoker.    cessation counseling provided  refused nicotine patch.     High cholesterol  cont zocor  DASH/TLC diet.     Essential hypertension.   cont losartan / amlodipine / coreg.  Daughter brought in medication list, on 2 BB and ACE and ARB? Will optimize RX, resume HCTZ, if remains elevated will increase Norvasc to 10 mg    Type 2 diabetes mellitus:   Pt on Levemir 62 bid and high sliding scale at home.   BG uncontrolled, A1C 9+ BG elevated due to steroids, also food brought from home to patient  Change levemir to lantus, increase premeal insulin to 20 units TID, c/w high SSL  POC qAC/HS.   Diabetes Educator consult, Nutrition consult, Endocrine consult.     Depression.   As per daughter has not seen Psychiatrist or therapist, on 2 SSRI's? Psych consulted for optimization of medication    DVT ppx. Plan: Lovenox

## 2018-02-07 NOTE — PROGRESS NOTE ADULT - ATTENDING COMMENTS
I have seen and examined the patient with NP and agree with the above assessment and plan.  Pt improving clinically, wheezing better, taper solumedrol to q8. BG uncontrolled- uptitrate insulin, diabetic teaching and endocrine consult.

## 2018-02-07 NOTE — ADVANCED PRACTICE NURSE CONSULT - RECOMMEDATIONS
continue diabetes self management education  increasel humalog pre meal to 20 units  please change levemir to lantus 62 units 2xdaily
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
## 2018-02-08 LAB
ANION GAP SERPL CALC-SCNC: 10 MMOL/L — SIGNIFICANT CHANGE UP (ref 5–17)
BUN SERPL-MCNC: 20 MG/DL — SIGNIFICANT CHANGE UP (ref 8–20)
CALCIUM SERPL-MCNC: 8.9 MG/DL — SIGNIFICANT CHANGE UP (ref 8.6–10.2)
CHLORIDE SERPL-SCNC: 95 MMOL/L — LOW (ref 98–107)
CO2 SERPL-SCNC: 35 MMOL/L — HIGH (ref 22–29)
CREAT SERPL-MCNC: 0.44 MG/DL — LOW (ref 0.5–1.3)
GLUCOSE BLDC GLUCOMTR-MCNC: 135 MG/DL — HIGH (ref 70–99)
GLUCOSE BLDC GLUCOMTR-MCNC: 211 MG/DL — HIGH (ref 70–99)
GLUCOSE BLDC GLUCOMTR-MCNC: 234 MG/DL — HIGH (ref 70–99)
GLUCOSE BLDC GLUCOMTR-MCNC: 282 MG/DL — HIGH (ref 70–99)
GLUCOSE SERPL-MCNC: 134 MG/DL — HIGH (ref 70–115)
HCT VFR BLD CALC: 46.8 % — SIGNIFICANT CHANGE UP (ref 37–47)
HGB BLD-MCNC: 14.7 G/DL — SIGNIFICANT CHANGE UP (ref 12–16)
MAGNESIUM SERPL-MCNC: 2.1 MG/DL — SIGNIFICANT CHANGE UP (ref 1.6–2.6)
MCHC RBC-ENTMCNC: 28.4 PG — SIGNIFICANT CHANGE UP (ref 27–31)
MCHC RBC-ENTMCNC: 31.4 G/DL — LOW (ref 32–36)
MCV RBC AUTO: 90.5 FL — SIGNIFICANT CHANGE UP (ref 81–99)
PHOSPHATE SERPL-MCNC: 3.4 MG/DL — SIGNIFICANT CHANGE UP (ref 2.4–4.7)
PLATELET # BLD AUTO: 241 K/UL — SIGNIFICANT CHANGE UP (ref 150–400)
POTASSIUM SERPL-MCNC: 4 MMOL/L — SIGNIFICANT CHANGE UP (ref 3.5–5.3)
POTASSIUM SERPL-SCNC: 4 MMOL/L — SIGNIFICANT CHANGE UP (ref 3.5–5.3)
RBC # BLD: 5.17 M/UL — SIGNIFICANT CHANGE UP (ref 4.4–5.2)
RBC # FLD: 14 % — SIGNIFICANT CHANGE UP (ref 11–15.6)
SODIUM SERPL-SCNC: 140 MMOL/L — SIGNIFICANT CHANGE UP (ref 135–145)
WBC # BLD: 16 K/UL — HIGH (ref 4.8–10.8)
WBC # FLD AUTO: 16 K/UL — HIGH (ref 4.8–10.8)

## 2018-02-08 PROCEDURE — 99233 SBSQ HOSP IP/OBS HIGH 50: CPT

## 2018-02-08 PROCEDURE — 93306 TTE W/DOPPLER COMPLETE: CPT | Mod: 26

## 2018-02-08 RX ORDER — DOCUSATE SODIUM 100 MG
100 CAPSULE ORAL
Qty: 0 | Refills: 0 | Status: DISCONTINUED | OUTPATIENT
Start: 2018-02-08 | End: 2018-02-12

## 2018-02-08 RX ORDER — AMLODIPINE BESYLATE 2.5 MG/1
5 TABLET ORAL ONCE
Qty: 0 | Refills: 0 | Status: COMPLETED | OUTPATIENT
Start: 2018-02-08 | End: 2018-02-08

## 2018-02-08 RX ORDER — AMLODIPINE BESYLATE 2.5 MG/1
10 TABLET ORAL DAILY
Qty: 0 | Refills: 0 | Status: DISCONTINUED | OUTPATIENT
Start: 2018-02-09 | End: 2018-02-12

## 2018-02-08 RX ORDER — ALBUTEROL 90 UG/1
2.5 AEROSOL, METERED ORAL EVERY 4 HOURS
Qty: 0 | Refills: 0 | Status: DISCONTINUED | OUTPATIENT
Start: 2018-02-08 | End: 2018-02-12

## 2018-02-08 RX ORDER — BUDESONIDE AND FORMOTEROL FUMARATE DIHYDRATE 160; 4.5 UG/1; UG/1
2 AEROSOL RESPIRATORY (INHALATION)
Qty: 0 | Refills: 0 | Status: DISCONTINUED | OUTPATIENT
Start: 2018-02-08 | End: 2018-02-12

## 2018-02-08 RX ORDER — POLYETHYLENE GLYCOL 3350 17 G/17G
17 POWDER, FOR SOLUTION ORAL DAILY
Qty: 0 | Refills: 0 | Status: DISCONTINUED | OUTPATIENT
Start: 2018-02-08 | End: 2018-02-12

## 2018-02-08 RX ADMIN — Medication 20 UNIT(S): at 17:39

## 2018-02-08 RX ADMIN — Medication 100 MILLIGRAM(S): at 17:41

## 2018-02-08 RX ADMIN — Medication 12: at 13:15

## 2018-02-08 RX ADMIN — Medication 3 MILLILITER(S): at 12:49

## 2018-02-08 RX ADMIN — Medication 40 MILLIGRAM(S): at 17:41

## 2018-02-08 RX ADMIN — Medication 3 MILLILITER(S): at 23:53

## 2018-02-08 RX ADMIN — Medication 40 MILLIGRAM(S): at 05:51

## 2018-02-08 RX ADMIN — ZOLPIDEM TARTRATE 10 MILLIGRAM(S): 10 TABLET ORAL at 21:58

## 2018-02-08 RX ADMIN — CARVEDILOL PHOSPHATE 6.25 MILLIGRAM(S): 80 CAPSULE, EXTENDED RELEASE ORAL at 17:40

## 2018-02-08 RX ADMIN — Medication 75 MILLIGRAM(S): at 17:41

## 2018-02-08 RX ADMIN — Medication 60 MILLIGRAM(S): at 12:11

## 2018-02-08 RX ADMIN — SIMVASTATIN 20 MILLIGRAM(S): 20 TABLET, FILM COATED ORAL at 21:57

## 2018-02-08 RX ADMIN — Medication 75 MILLIGRAM(S): at 05:51

## 2018-02-08 RX ADMIN — Medication 20 UNIT(S): at 13:14

## 2018-02-08 RX ADMIN — AMLODIPINE BESYLATE 5 MILLIGRAM(S): 2.5 TABLET ORAL at 05:51

## 2018-02-08 RX ADMIN — INSULIN GLARGINE 60 UNIT(S): 100 INJECTION, SOLUTION SUBCUTANEOUS at 22:22

## 2018-02-08 RX ADMIN — ZOLPIDEM TARTRATE 10 MILLIGRAM(S): 10 TABLET ORAL at 00:13

## 2018-02-08 RX ADMIN — Medication 0.5 MILLIGRAM(S): at 12:10

## 2018-02-08 RX ADMIN — Medication 20 UNIT(S): at 08:49

## 2018-02-08 RX ADMIN — POLYETHYLENE GLYCOL 3350 17 GRAM(S): 17 POWDER, FOR SOLUTION ORAL at 13:22

## 2018-02-08 RX ADMIN — Medication 3 MILLILITER(S): at 20:16

## 2018-02-08 RX ADMIN — CARVEDILOL PHOSPHATE 6.25 MILLIGRAM(S): 80 CAPSULE, EXTENDED RELEASE ORAL at 05:51

## 2018-02-08 RX ADMIN — Medication 0.5 MILLIGRAM(S): at 21:57

## 2018-02-08 RX ADMIN — BUDESONIDE AND FORMOTEROL FUMARATE DIHYDRATE 2 PUFF(S): 160; 4.5 AEROSOL RESPIRATORY (INHALATION) at 20:16

## 2018-02-08 RX ADMIN — AMLODIPINE BESYLATE 5 MILLIGRAM(S): 2.5 TABLET ORAL at 13:22

## 2018-02-08 RX ADMIN — Medication 3 MILLILITER(S): at 00:18

## 2018-02-08 RX ADMIN — Medication 3 MILLILITER(S): at 16:19

## 2018-02-08 RX ADMIN — ENOXAPARIN SODIUM 40 MILLIGRAM(S): 100 INJECTION SUBCUTANEOUS at 13:15

## 2018-02-08 RX ADMIN — INSULIN GLARGINE 60 UNIT(S): 100 INJECTION, SOLUTION SUBCUTANEOUS at 08:49

## 2018-02-08 RX ADMIN — Medication 8: at 17:40

## 2018-02-08 RX ADMIN — Medication 3 MILLILITER(S): at 03:30

## 2018-02-08 RX ADMIN — Medication 3 MILLILITER(S): at 08:19

## 2018-02-08 RX ADMIN — LOSARTAN POTASSIUM 100 MILLIGRAM(S): 100 TABLET, FILM COATED ORAL at 05:51

## 2018-02-08 RX ADMIN — Medication 25 MILLIGRAM(S): at 05:51

## 2018-02-08 NOTE — PROGRESS NOTE ADULT - ASSESSMENT
-Likely AECOPD  -Influenza B  -Hypoxia, nocturnal especially  -Likely JUSTINA  -Obesity  -Wheeze    RECC:   IV steroids; with taper. Tamiflu. Nebs. O2. Empiric noct cpap. Weight loss. Outpt PFTs and PSG.

## 2018-02-08 NOTE — PROGRESS NOTE ADULT - ASSESSMENT
67 y/ o female with PMHx of HTN, HLD, DM-II, active smoker, presented to ED with SOB, cough, headache, malaise and noted to have positive influenza and wheezing on exam, admitted for respiratory distress with hypoxia due to presumed COPD exacerbation given smoking h/o in setting of Influenza B infection.     Plan:    Acute Respiratory failure with hypoxia:   - Likely due to presumed COPD exacerbation (undiagnosed ) with +influenza  - C/w Nebs q 4hrs, while awake, solumedrol 40  q 8 hrs for goal >92%, albuterol neb prn  - Pulmonology input appreciated, as per patient has nebulizer at home but does not work.  - need pulse ox at rest and with ambulation on room air to determine need for home oxygen     Flu Influenza B positive:  supportive care, Tamiflu and isolation measures and precautions.     Lymphadenopathy.    - Paratracheal in setting of smoking history, likely reactive to acute infection however will need to outpatient f/u with yearly low dose CT scan with PMD.     Smoker.    cessation counseling provided  refused nicotine patch.     High cholesterol  cont zocor  DASH/TLC diet.     Essential hypertension.   cont losartan / amlodipine / coreg.  Daughter brought in medication list, on 2 BB and ACE and ARB? Will optimize RX, resumed HCTZ, IV fluids dc yesterday, BP remains elevated, will increase amlodipine to 10 mg daily    Type 2 diabetes mellitus:   Pt on Levemir 62 bid and high sliding scale at home.   BG uncontrolled, A1C 9+ BG elevated due to steroids, also food brought from home to patient  Continue  lantus, and  premeal insulin 20 units TID, c/w high SSL  POC qAC/HS.   Diabetes Educator following, Nutrition consult, Endocrine input appreciated    Depression/Anxiety:  As per daughter has not seen Psychiatrist or therapist, on 2 SSRI's? Psych consulted for optimization of medication    MI:  As per patient daughter has H/O MI, no Cardiology follow up, will obtain ECHO    JUSTINA:  Patient has CPAP at home, does not use, will touch base with Pulmonary as patient is desat while sleeping on nasal canula    DVT ppx. Plan: Lovenox 67 y/ o female with PMHx of HTN, HLD, DM-II, active smoker, presumed JUSTINA, presented to ED with SOB, cough, headache, malaise and noted to have positive influenza and wheezing on exam, admitted for respiratory distress with hypoxia due to presumed COPD exacerbation given smoking h/o in setting of Influenza B infection.     Plan:    Acute Respiratory failure with hypoxia:   - Likely due to presumed COPD exacerbation (undiagnosed) with +influenza  - C/w Nebs q 4hrs, while awake, solumedrol 40  q 8 hrs for goal >92%, albuterol neb prn  - Pulmonology input appreciated, as per patient has nebulizer at home but does not work.  - need pulse ox at rest and with ambulation on room air to determine need for home oxygen.     Flu Influenza B positive:  supportive care, Tamiflu and isolation measures and precautions.     Lymphadenopathy.    - Paratracheal in setting of smoking history, likely reactive to acute infection however will need to outpatient f/u with yearly low dose CT scan with PMD.     Smoker.    cessation counseling provided  refused nicotine patch.     High cholesterol  cont zocor  DASH/TLC diet.     Essential hypertension.   cont losartan / amlodipine / coreg.  Daughter brought in medication list, on 2 BB and ACE and ARB? Will optimize RX, resumed HCTZ, IV fluids dc yesterday, BP remains elevated, will increase amlodipine to 10 mg daily    Type 2 diabetes mellitus:   Pt on Levemir 62 bid and high sliding scale at home.   BG uncontrolled, A1C 9+ BG elevated due to steroids, also food brought from home to patient  Continue  lantus, and  premeal insulin 20 units TID, c/w high SSL  POC qAC/HS.   Diabetes Educator following, Nutrition consult, Endocrine input appreciated    Depression/Anxiety:  As per daughter has not seen Psychiatrist or therapist, on 2 SSRI's? Psych consulted for optimization of medication    H/o CAD:  As per patient daughter has H/O MI, no Cardiology follow up, will obtain ECHO    JUSTINA:  Patient has CPAP at home, does not use, will touch base with Pulmonary as patient is desat while sleeping on nasal canula    DVT ppx. Plan: Lovenox    Off note- Pt home medication list verified from patient- she is on 2 different BB, ARB and ACEIs at the same time, dual antidepressant and huge dose of sliding scale insulin. Meds needs to properly reconciled and few of the home meds needs to discontinued on discharge.

## 2018-02-09 DIAGNOSIS — F41.8 OTHER SPECIFIED ANXIETY DISORDERS: ICD-10-CM

## 2018-02-09 PROBLEM — Z00.00 ENCOUNTER FOR PREVENTIVE HEALTH EXAMINATION: Status: ACTIVE | Noted: 2018-02-09

## 2018-02-09 LAB
ANION GAP SERPL CALC-SCNC: 9 MMOL/L — SIGNIFICANT CHANGE UP (ref 5–17)
BUN SERPL-MCNC: 20 MG/DL — SIGNIFICANT CHANGE UP (ref 8–20)
CALCIUM SERPL-MCNC: 9.1 MG/DL — SIGNIFICANT CHANGE UP (ref 8.6–10.2)
CHLORIDE SERPL-SCNC: 93 MMOL/L — LOW (ref 98–107)
CO2 SERPL-SCNC: 40 MMOL/L — HIGH (ref 22–29)
CREAT SERPL-MCNC: 0.48 MG/DL — LOW (ref 0.5–1.3)
GLUCOSE BLDC GLUCOMTR-MCNC: 120 MG/DL — HIGH (ref 70–99)
GLUCOSE BLDC GLUCOMTR-MCNC: 202 MG/DL — HIGH (ref 70–99)
GLUCOSE BLDC GLUCOMTR-MCNC: 211 MG/DL — HIGH (ref 70–99)
GLUCOSE BLDC GLUCOMTR-MCNC: 277 MG/DL — HIGH (ref 70–99)
GLUCOSE SERPL-MCNC: 153 MG/DL — HIGH (ref 70–115)
HCT VFR BLD CALC: 48.4 % — HIGH (ref 37–47)
HGB BLD-MCNC: 15.1 G/DL — SIGNIFICANT CHANGE UP (ref 12–16)
MAGNESIUM SERPL-MCNC: 2.1 MG/DL — SIGNIFICANT CHANGE UP (ref 1.6–2.6)
MCHC RBC-ENTMCNC: 27.8 PG — SIGNIFICANT CHANGE UP (ref 27–31)
MCHC RBC-ENTMCNC: 31.2 G/DL — LOW (ref 32–36)
MCV RBC AUTO: 89.1 FL — SIGNIFICANT CHANGE UP (ref 81–99)
PHOSPHATE SERPL-MCNC: 3.4 MG/DL — SIGNIFICANT CHANGE UP (ref 2.4–4.7)
PLATELET # BLD AUTO: 238 K/UL — SIGNIFICANT CHANGE UP (ref 150–400)
POTASSIUM SERPL-MCNC: 4.4 MMOL/L — SIGNIFICANT CHANGE UP (ref 3.5–5.3)
POTASSIUM SERPL-SCNC: 4.4 MMOL/L — SIGNIFICANT CHANGE UP (ref 3.5–5.3)
RBC # BLD: 5.43 M/UL — HIGH (ref 4.4–5.2)
RBC # FLD: 13.8 % — SIGNIFICANT CHANGE UP (ref 11–15.6)
SODIUM SERPL-SCNC: 142 MMOL/L — SIGNIFICANT CHANGE UP (ref 135–145)
WBC # BLD: 10.6 K/UL — SIGNIFICANT CHANGE UP (ref 4.8–10.8)
WBC # FLD AUTO: 10.6 K/UL — SIGNIFICANT CHANGE UP (ref 4.8–10.8)

## 2018-02-09 PROCEDURE — 99233 SBSQ HOSP IP/OBS HIGH 50: CPT

## 2018-02-09 PROCEDURE — 99222 1ST HOSP IP/OBS MODERATE 55: CPT

## 2018-02-09 PROCEDURE — 99232 SBSQ HOSP IP/OBS MODERATE 35: CPT

## 2018-02-09 RX ORDER — HYDRALAZINE HCL 50 MG
25 TABLET ORAL
Qty: 0 | Refills: 0 | Status: DISCONTINUED | OUTPATIENT
Start: 2018-02-09 | End: 2018-02-10

## 2018-02-09 RX ORDER — HYDRALAZINE HCL 50 MG
10 TABLET ORAL ONCE
Qty: 0 | Refills: 0 | Status: COMPLETED | OUTPATIENT
Start: 2018-02-09 | End: 2018-02-09

## 2018-02-09 RX ADMIN — CARVEDILOL PHOSPHATE 6.25 MILLIGRAM(S): 80 CAPSULE, EXTENDED RELEASE ORAL at 17:12

## 2018-02-09 RX ADMIN — Medication 10 MILLIGRAM(S): at 23:59

## 2018-02-09 RX ADMIN — Medication 25 MILLIGRAM(S): at 17:12

## 2018-02-09 RX ADMIN — Medication 3 MILLILITER(S): at 12:35

## 2018-02-09 RX ADMIN — Medication 3 MILLILITER(S): at 08:29

## 2018-02-09 RX ADMIN — ZOLPIDEM TARTRATE 10 MILLIGRAM(S): 10 TABLET ORAL at 21:51

## 2018-02-09 RX ADMIN — Medication 20 UNIT(S): at 12:42

## 2018-02-09 RX ADMIN — Medication 3 MILLILITER(S): at 15:24

## 2018-02-09 RX ADMIN — Medication 75 MILLIGRAM(S): at 17:12

## 2018-02-09 RX ADMIN — Medication 100 MILLIGRAM(S): at 06:21

## 2018-02-09 RX ADMIN — Medication 20 UNIT(S): at 17:12

## 2018-02-09 RX ADMIN — AMLODIPINE BESYLATE 10 MILLIGRAM(S): 2.5 TABLET ORAL at 06:21

## 2018-02-09 RX ADMIN — ENOXAPARIN SODIUM 40 MILLIGRAM(S): 100 INJECTION SUBCUTANEOUS at 12:43

## 2018-02-09 RX ADMIN — INSULIN GLARGINE 60 UNIT(S): 100 INJECTION, SOLUTION SUBCUTANEOUS at 21:46

## 2018-02-09 RX ADMIN — Medication 75 MILLIGRAM(S): at 06:21

## 2018-02-09 RX ADMIN — Medication 40 MILLIGRAM(S): at 17:12

## 2018-02-09 RX ADMIN — BUDESONIDE AND FORMOTEROL FUMARATE DIHYDRATE 2 PUFF(S): 160; 4.5 AEROSOL RESPIRATORY (INHALATION) at 20:35

## 2018-02-09 RX ADMIN — CARVEDILOL PHOSPHATE 6.25 MILLIGRAM(S): 80 CAPSULE, EXTENDED RELEASE ORAL at 06:21

## 2018-02-09 RX ADMIN — Medication 25 MILLIGRAM(S): at 06:21

## 2018-02-09 RX ADMIN — Medication 60 MILLIGRAM(S): at 14:04

## 2018-02-09 RX ADMIN — LOSARTAN POTASSIUM 100 MILLIGRAM(S): 100 TABLET, FILM COATED ORAL at 06:21

## 2018-02-09 RX ADMIN — Medication 40 MILLIGRAM(S): at 06:21

## 2018-02-09 RX ADMIN — Medication 12: at 17:13

## 2018-02-09 RX ADMIN — Medication 8: at 12:43

## 2018-02-09 RX ADMIN — Medication 20 UNIT(S): at 09:07

## 2018-02-09 RX ADMIN — Medication 3 MILLILITER(S): at 20:36

## 2018-02-09 RX ADMIN — Medication 3 MILLILITER(S): at 04:03

## 2018-02-09 RX ADMIN — INSULIN GLARGINE 60 UNIT(S): 100 INJECTION, SOLUTION SUBCUTANEOUS at 09:06

## 2018-02-09 RX ADMIN — SIMVASTATIN 20 MILLIGRAM(S): 20 TABLET, FILM COATED ORAL at 21:45

## 2018-02-09 RX ADMIN — BUDESONIDE AND FORMOTEROL FUMARATE DIHYDRATE 2 PUFF(S): 160; 4.5 AEROSOL RESPIRATORY (INHALATION) at 08:31

## 2018-02-09 NOTE — BEHAVIORAL HEALTH ASSESSMENT NOTE - NSBHCONSULTRECOMMENDOTHER_PSY_A_CORE FT
Discussed with patient the importance of CPAP compliance which will help her sleep - explained that benzos and Ambien are not good medications for her since she has JUSTINA and can further facilitate respiratory depression and that she should not use those unless necessary.     D/c Lexapro, no need for 2 SSRI medications. Pt is comfortable with Prozac she has been on it for a long time.    Also recommended outpatient therapy, daughter is familiar with [facility] in [place] and states she will set up an appt with her there.

## 2018-02-09 NOTE — DIETITIAN INITIAL EVALUATION ADULT. - OTHER INFO
Pt admitted for the flu. Pt tolerating cons cho, dash diet- 100% PO intake per EMR. Pt seen by CDE. RD to follow up with diet education.

## 2018-02-09 NOTE — BEHAVIORAL HEALTH ASSESSMENT NOTE - NSBHCHARTREVIEWVS_PSY_A_CORE FT
T(C): 36.3 (09 Feb 2018 07:53), Max: 36.4 (08 Feb 2018 21:54)  T(F): 97.4 (09 Feb 2018 07:53), Max: 97.6 (08 Feb 2018 21:54)  HR: 60 (09 Feb 2018 07:53) (59 - 69)  BP: 168/78 (09 Feb 2018 07:53) (164/78 - 205/89)  RR: 18 (09 Feb 2018 07:53) (18 - 20)  SpO2: 95% (09 Feb 2018 07:53) (85% - 99%)

## 2018-02-09 NOTE — PROGRESS NOTE ADULT - ASSESSMENT
Morbidly obese female with influenza complicated by COPD.  Currently with active wheeze.    Plan:  1.Continue steroids, bronchodilators  2.Nocturnal BIPAP  3.Needs outpatient sleep and pulmonary evaluations

## 2018-02-09 NOTE — PROGRESS NOTE ADULT - ASSESSMENT
67 y/ o female with PMHx of HTN, HLD, DM-II, active smoker, presumed JUSTINA, presented to ED with SOB, cough, headache, malaise and noted to have positive influenza and wheezing on exam, admitted for respiratory distress with hypoxia due to presumed COPD exacerbation given smoking h/o in setting of Influenza B infection.     Plan:    Acute Respiratory failure with hypoxia:   - Likely due to presumed COPD exacerbation (undiagnosed) with +influenza  - C/w Nebs q 6hrs, while awake, solumedrol 40  q 12 hrs for goal >92%, albuterol neb prn  - Pulmonology input appreciated, as per patient has nebulizer at home but does not work.  - Pulse ox on room air with ambulation is 85%, will need home oxygen    Flu Influenza B positive:  supportive care, Tamiflu and isolation measures and precautions.     Lymphadenopathy.    - Paratracheal in setting of smoking history, likely reactive to acute infection however will need to outpatient f/u with yearly low dose CT scan with PMD.     Smoker.    cessation counseling provided  refused nicotine patch.     High cholesterol  cont zocor  DASH/TLC diet.     Essential hypertension.  Not at goal  cont losartan / amlodipine / coreg.  Daughter brought in medication list, on 2 BB and ACE and ARB? Will optimize RX, resumed HCTZ, amlodipine to 10 mg daily, Hydralazine 25mg BID added    Type 2 diabetes mellitus:   Pt on Levemir 62 bid and high sliding scale at home.   BG uncontrolled, A1C 9+ BG elevated due to steroids, also food brought from home to patient  Continue  lantus, and  premeal insulin 20 units TID, c/w high SSL  POC qAC/HS.   Diabetes Educator following, Nutrition consulted, Endocrine input appreciated    Depression/Anxiety:  As per daughter has not seen Psychiatrist or therapist, on 2 SSRI's? Psych input appreciated    H/o CAD:  Echo: EF normal with Grade I diastolic dysfunction    JUSTINA:  Patient has CPAP at home, does not use. Prescribed last night with good results, will need outpatient sleep study    DVT ppx. Plan: Lovenox 67 y/ o female with PMHx of HTN, HLD, DM-II, depression, insomnia, active smoker, presumed JUSTINA, presented to ED with SOB, cough, headache, malaise and noted to have positive influenza and wheezing on exam, admitted for respiratory distress with hypoxia due to presumed COPD exacerbation given smoking h/o in setting of Influenza B infection.     Plan:    Acute Respiratory failure with hypoxia:   - Likely due to presumed COPD exacerbation (undiagnosed) with +influenza  - C/w Nebs q 6hrs, while awake, solumedrol 40  q 12 hrs for goal >92%, albuterol neb prn. Taper steroid as per clinical response.  - Pulmonology input appreciated, as per patient has nebulizer at home but does not work.  - Pulse ox on room air with ambulation is 85%, will need home oxygen.    Flu Influenza B positive:  supportive care, Tamiflu D-4 and isolation measures and precautions.     Lymphadenopathy.    - Paratracheal in setting of smoking history, likely reactive to acute infection however will need to outpatient f/u with yearly low dose CT scan with PMD.     Smoker.    cessation counseling provided  refused nicotine patch.     High cholesterol  -cont zocor  -DASH/TLC diet.     Essential hypertension.    Not at goal  cont losartan / amlodipine / coreg, HCTZ. Hydralazine added.   Daughter brought in medication list, on 2 BB and ACE and ARB    Type 2 diabetes mellitus:   Pt on Levemir 62 bid and high sliding scale at home.   BG uncontrolled, A1C 9+ BG elevated due to steroids, also food brought from home to patient  Continue  Lantus and  pre meal insulin 20 units TID, c/w high SSL  Diabetes Educator following, Nutrition consulted, Endocrine input appreciated    Depression/Anxiety:  As per daughter has not seen Psychiatrist or therapist, on 2 SSRI's? Psych input appreciated- d/c lexapro and continue with prozac.    H/o CAD:  Echo: EF normal with Grade I diastolic dysfunction    JUSTINA:  Patient has CPAP at home, does not use. Prescribed last night with good results, will need outpatient sleep study.    DVT ppx. Plan: Lovenox

## 2018-02-09 NOTE — BEHAVIORAL HEALTH ASSESSMENT NOTE - HPI (INCLUDE ILLNESS QUALITY, SEVERITY, DURATION, TIMING, CONTEXT, MODIFYING FACTORS, ASSOCIATED SIGNS AND SYMPTOMS)
Pt is a 67 yo female with PMHx of DM, HTN, HLD, JUSTINA (con-compliant with CPAP), smoker x20 years (Quit 3 days ago) who presented to [facility] for SOB and weakness (+influenza). Pt is prescribed medications for anxiety and depression by her PCP (Lexapro, Prozac, and Xanax). Pt states she had been on Prozac for "quite a while" without a problem and Lexapro had recently been added because the patient had been becoming more depressed over the past year as per her daughter. Pt also admits to taking Xanax and Ambien to help her sleep. Daughter states that pt had been compliant with CPAP when she first began using it but has been non-compliant recently. Daughter also notes pt has been forgetful recently and it has been worse at the hospital. Pt denies SI/HI, AVH. Pt has no prior psychiatric history or psychiatric hospitalizations. Pt is not seeing outpatient therapist or psychiatrist. Pt denies history of substance abuse. Apparent situational stressors are multiple including concern for health issues and financial issues.

## 2018-02-09 NOTE — BEHAVIORAL HEALTH ASSESSMENT NOTE - NSBHCHARTREVIEWLAB_PSY_A_CORE FT
15.1   10.6  )-----------( 238      ( 09 Feb 2018 07:19 )             48.4     02-09    142  |  93<L>  |  20.0  ----------------------------<  153<H>  4.4   |  40.0<H>  |  0.48<L>    Ca    9.1      09 Feb 2018 07:19  Phos  3.4     02-09  Mg     2.1     02-09

## 2018-02-09 NOTE — DIETITIAN INITIAL EVALUATION ADULT. - PROBLEM/PLAN-6
Normal vision: sees adequately in most situations; can see medication labels, newsprint DISPLAY PLAN FREE TEXT

## 2018-02-09 NOTE — BEHAVIORAL HEALTH ASSESSMENT NOTE - SUMMARY
Pt is a 69 yo female with PMHx of DM, HTN, HLD, JUSTINA (con-compliant with CPAP), smoker x20 years who presented to [facility] for influenza. Pt is prescribed medications for anxiety and depression by her PCP (Lexapro, Prozac, and Xanax) and needs med management. Pt is on 2 SSRI's and is taking benzodiazepines and Ambien to help her sleep which are not appropriate for individuals with JUSTINA.

## 2018-02-09 NOTE — PROGRESS NOTE ADULT - ATTENDING COMMENTS
I have seen and examined the patient with NP and agree with the above assessment and plan. I have seen and examined the patient with NP and agree with the above assessment and plan. Pt improving clinically, not yet ready.  Off note, Pt will likely require home O2 on discharge. Pt home medication list verified from patient daughter- she is on 2 different BB, ARB and ACEIs at the same time, dual antidepressant and huge dose of sliding scale insulin. Meds needs to properly reconciled and few of the home meds needs to discontinued on discharge.

## 2018-02-10 LAB
GLUCOSE BLDC GLUCOMTR-MCNC: 107 MG/DL — HIGH (ref 70–99)
GLUCOSE BLDC GLUCOMTR-MCNC: 129 MG/DL — HIGH (ref 70–99)
GLUCOSE BLDC GLUCOMTR-MCNC: 163 MG/DL — HIGH (ref 70–99)
GLUCOSE BLDC GLUCOMTR-MCNC: 186 MG/DL — HIGH (ref 70–99)

## 2018-02-10 PROCEDURE — 99232 SBSQ HOSP IP/OBS MODERATE 35: CPT

## 2018-02-10 RX ORDER — HYDRALAZINE HCL 50 MG
50 TABLET ORAL EVERY 8 HOURS
Qty: 0 | Refills: 0 | Status: DISCONTINUED | OUTPATIENT
Start: 2018-02-10 | End: 2018-02-12

## 2018-02-10 RX ORDER — CARVEDILOL PHOSPHATE 80 MG/1
12.5 CAPSULE, EXTENDED RELEASE ORAL EVERY 12 HOURS
Qty: 0 | Refills: 0 | Status: DISCONTINUED | OUTPATIENT
Start: 2018-02-10 | End: 2018-02-12

## 2018-02-10 RX ORDER — CARVEDILOL PHOSPHATE 80 MG/1
6.25 CAPSULE, EXTENDED RELEASE ORAL ONCE
Qty: 0 | Refills: 0 | Status: COMPLETED | OUTPATIENT
Start: 2018-02-10 | End: 2018-02-10

## 2018-02-10 RX ORDER — IPRATROPIUM/ALBUTEROL SULFATE 18-103MCG
3 AEROSOL WITH ADAPTER (GRAM) INHALATION EVERY 6 HOURS
Qty: 0 | Refills: 0 | Status: DISCONTINUED | OUTPATIENT
Start: 2018-02-10 | End: 2018-02-12

## 2018-02-10 RX ADMIN — Medication 3 MILLILITER(S): at 12:20

## 2018-02-10 RX ADMIN — BUDESONIDE AND FORMOTEROL FUMARATE DIHYDRATE 2 PUFF(S): 160; 4.5 AEROSOL RESPIRATORY (INHALATION) at 20:21

## 2018-02-10 RX ADMIN — Medication 3 MILLILITER(S): at 20:24

## 2018-02-10 RX ADMIN — Medication 25 MILLIGRAM(S): at 06:23

## 2018-02-10 RX ADMIN — Medication 20 UNIT(S): at 12:49

## 2018-02-10 RX ADMIN — Medication 3 MILLILITER(S): at 08:35

## 2018-02-10 RX ADMIN — POLYETHYLENE GLYCOL 3350 17 GRAM(S): 17 POWDER, FOR SOLUTION ORAL at 12:11

## 2018-02-10 RX ADMIN — ZOLPIDEM TARTRATE 10 MILLIGRAM(S): 10 TABLET ORAL at 23:57

## 2018-02-10 RX ADMIN — Medication 50 MILLIGRAM(S): at 22:08

## 2018-02-10 RX ADMIN — INSULIN GLARGINE 60 UNIT(S): 100 INJECTION, SOLUTION SUBCUTANEOUS at 22:11

## 2018-02-10 RX ADMIN — Medication 50 MILLIGRAM(S): at 16:18

## 2018-02-10 RX ADMIN — Medication 20 UNIT(S): at 17:29

## 2018-02-10 RX ADMIN — Medication 20 UNIT(S): at 08:51

## 2018-02-10 RX ADMIN — Medication 60 MILLIGRAM(S): at 12:11

## 2018-02-10 RX ADMIN — Medication 40 MILLIGRAM(S): at 06:23

## 2018-02-10 RX ADMIN — ENOXAPARIN SODIUM 40 MILLIGRAM(S): 100 INJECTION SUBCUTANEOUS at 12:12

## 2018-02-10 RX ADMIN — INSULIN GLARGINE 60 UNIT(S): 100 INJECTION, SOLUTION SUBCUTANEOUS at 08:55

## 2018-02-10 RX ADMIN — Medication 4: at 12:49

## 2018-02-10 RX ADMIN — Medication 0.5 MILLIGRAM(S): at 19:54

## 2018-02-10 RX ADMIN — BUDESONIDE AND FORMOTEROL FUMARATE DIHYDRATE 2 PUFF(S): 160; 4.5 AEROSOL RESPIRATORY (INHALATION) at 08:35

## 2018-02-10 RX ADMIN — Medication 3 MILLILITER(S): at 00:23

## 2018-02-10 RX ADMIN — CARVEDILOL PHOSPHATE 6.25 MILLIGRAM(S): 80 CAPSULE, EXTENDED RELEASE ORAL at 06:23

## 2018-02-10 RX ADMIN — CARVEDILOL PHOSPHATE 6.25 MILLIGRAM(S): 80 CAPSULE, EXTENDED RELEASE ORAL at 16:18

## 2018-02-10 RX ADMIN — LOSARTAN POTASSIUM 100 MILLIGRAM(S): 100 TABLET, FILM COATED ORAL at 06:23

## 2018-02-10 RX ADMIN — SIMVASTATIN 20 MILLIGRAM(S): 20 TABLET, FILM COATED ORAL at 22:07

## 2018-02-10 RX ADMIN — Medication 3 MILLILITER(S): at 03:53

## 2018-02-10 RX ADMIN — AMLODIPINE BESYLATE 10 MILLIGRAM(S): 2.5 TABLET ORAL at 06:23

## 2018-02-10 RX ADMIN — Medication 75 MILLIGRAM(S): at 06:23

## 2018-02-10 NOTE — PROGRESS NOTE ADULT - ASSESSMENT
Reasonably good control of diabetes despite steroid use. Pt. will likely require revised orders as steroids are tapered.

## 2018-02-10 NOTE — PROGRESS NOTE ADULT - ASSESSMENT
Improved wheeze  JUSTINA    Plan;  1.Decrease steroids  2.Continue BD's  3.Probably can be d/c'd in next 24-48 hours  4.Will need outpatient sleep evaluation  5.Taper steroids as outpatient

## 2018-02-11 LAB
ANION GAP SERPL CALC-SCNC: 7 MMOL/L — SIGNIFICANT CHANGE UP (ref 5–17)
BUN SERPL-MCNC: 15 MG/DL — SIGNIFICANT CHANGE UP (ref 8–20)
CALCIUM SERPL-MCNC: 9 MG/DL — SIGNIFICANT CHANGE UP (ref 8.6–10.2)
CHLORIDE SERPL-SCNC: 89 MMOL/L — LOW (ref 98–107)
CO2 SERPL-SCNC: 40 MMOL/L — HIGH (ref 22–29)
CREAT SERPL-MCNC: 0.53 MG/DL — SIGNIFICANT CHANGE UP (ref 0.5–1.3)
GLUCOSE BLDC GLUCOMTR-MCNC: 148 MG/DL — HIGH (ref 70–99)
GLUCOSE BLDC GLUCOMTR-MCNC: 179 MG/DL — HIGH (ref 70–99)
GLUCOSE BLDC GLUCOMTR-MCNC: 210 MG/DL — HIGH (ref 70–99)
GLUCOSE BLDC GLUCOMTR-MCNC: 289 MG/DL — HIGH (ref 70–99)
GLUCOSE SERPL-MCNC: 76 MG/DL — SIGNIFICANT CHANGE UP (ref 70–115)
HCT VFR BLD CALC: 50.5 % — HIGH (ref 37–47)
HGB BLD-MCNC: 16.2 G/DL — HIGH (ref 12–16)
MAGNESIUM SERPL-MCNC: 2 MG/DL — SIGNIFICANT CHANGE UP (ref 1.6–2.6)
MCHC RBC-ENTMCNC: 28.5 PG — SIGNIFICANT CHANGE UP (ref 27–31)
MCHC RBC-ENTMCNC: 32.1 G/DL — SIGNIFICANT CHANGE UP (ref 32–36)
MCV RBC AUTO: 88.9 FL — SIGNIFICANT CHANGE UP (ref 81–99)
PHOSPHATE SERPL-MCNC: 4 MG/DL — SIGNIFICANT CHANGE UP (ref 2.4–4.7)
PLATELET # BLD AUTO: 288 K/UL — SIGNIFICANT CHANGE UP (ref 150–400)
POTASSIUM SERPL-MCNC: 3.9 MMOL/L — SIGNIFICANT CHANGE UP (ref 3.5–5.3)
POTASSIUM SERPL-SCNC: 3.9 MMOL/L — SIGNIFICANT CHANGE UP (ref 3.5–5.3)
RBC # BLD: 5.68 M/UL — HIGH (ref 4.4–5.2)
RBC # FLD: 14 % — SIGNIFICANT CHANGE UP (ref 11–15.6)
SODIUM SERPL-SCNC: 136 MMOL/L — SIGNIFICANT CHANGE UP (ref 135–145)
WBC # BLD: 11.9 K/UL — HIGH (ref 4.8–10.8)
WBC # FLD AUTO: 11.9 K/UL — HIGH (ref 4.8–10.8)

## 2018-02-11 RX ADMIN — LOSARTAN POTASSIUM 100 MILLIGRAM(S): 100 TABLET, FILM COATED ORAL at 06:20

## 2018-02-11 RX ADMIN — Medication 40 MILLIGRAM(S): at 06:21

## 2018-02-11 RX ADMIN — Medication 4: at 09:30

## 2018-02-11 RX ADMIN — AMLODIPINE BESYLATE 10 MILLIGRAM(S): 2.5 TABLET ORAL at 06:20

## 2018-02-11 RX ADMIN — Medication 20 UNIT(S): at 12:05

## 2018-02-11 RX ADMIN — Medication 8: at 12:04

## 2018-02-11 RX ADMIN — Medication 60 MILLIGRAM(S): at 12:06

## 2018-02-11 RX ADMIN — CARVEDILOL PHOSPHATE 12.5 MILLIGRAM(S): 80 CAPSULE, EXTENDED RELEASE ORAL at 06:20

## 2018-02-11 RX ADMIN — Medication 25 MILLIGRAM(S): at 06:34

## 2018-02-11 RX ADMIN — INSULIN GLARGINE 60 UNIT(S): 100 INJECTION, SOLUTION SUBCUTANEOUS at 09:31

## 2018-02-11 RX ADMIN — BUDESONIDE AND FORMOTEROL FUMARATE DIHYDRATE 2 PUFF(S): 160; 4.5 AEROSOL RESPIRATORY (INHALATION) at 21:45

## 2018-02-11 RX ADMIN — Medication 3 MILLILITER(S): at 15:32

## 2018-02-11 RX ADMIN — BUDESONIDE AND FORMOTEROL FUMARATE DIHYDRATE 2 PUFF(S): 160; 4.5 AEROSOL RESPIRATORY (INHALATION) at 08:15

## 2018-02-11 RX ADMIN — Medication 12: at 17:53

## 2018-02-11 RX ADMIN — ENOXAPARIN SODIUM 40 MILLIGRAM(S): 100 INJECTION SUBCUTANEOUS at 12:06

## 2018-02-11 RX ADMIN — Medication 3 MILLILITER(S): at 08:15

## 2018-02-11 RX ADMIN — Medication 100 MILLIGRAM(S): at 06:20

## 2018-02-11 RX ADMIN — ZOLPIDEM TARTRATE 10 MILLIGRAM(S): 10 TABLET ORAL at 23:12

## 2018-02-11 RX ADMIN — CARVEDILOL PHOSPHATE 12.5 MILLIGRAM(S): 80 CAPSULE, EXTENDED RELEASE ORAL at 17:54

## 2018-02-11 RX ADMIN — Medication 20 UNIT(S): at 09:30

## 2018-02-11 RX ADMIN — Medication 50 MILLIGRAM(S): at 14:50

## 2018-02-11 RX ADMIN — Medication 50 MILLIGRAM(S): at 06:20

## 2018-02-11 RX ADMIN — Medication 3 MILLILITER(S): at 21:45

## 2018-02-11 RX ADMIN — INSULIN GLARGINE 60 UNIT(S): 100 INJECTION, SOLUTION SUBCUTANEOUS at 23:07

## 2018-02-11 RX ADMIN — SIMVASTATIN 20 MILLIGRAM(S): 20 TABLET, FILM COATED ORAL at 23:04

## 2018-02-11 RX ADMIN — Medication 20 UNIT(S): at 17:53

## 2018-02-11 RX ADMIN — Medication 0.5 MILLIGRAM(S): at 17:58

## 2018-02-11 RX ADMIN — Medication 50 MILLIGRAM(S): at 23:04

## 2018-02-11 NOTE — PROGRESS NOTE ADULT - PROBLEM SELECTOR PLAN 1
-cont nebs, cont steroids, due to flu  -will need outpatient f/u with pulmonary, including PFTs and sleep study  -cont bipap at night  -decrease nebs to Q6H
-cont nebs, cont steroids, due to flu  -will need outpatient f/u with pulmonary, including PFTs and sleep study  -cont bipap at night, d/c with bipap and oxygen  -decrease nebs to Q6H
with hypoxia likely due to presumed COPD exacerbation  C/w Zithromax, Nebs q 6 hrs, solumedrol 40 q6 for today, prn SOB for goal >92%  Pulmonology consult- will need to establish outpatient f/u and PFT- never seen pulmonologist in past.

## 2018-02-11 NOTE — PROGRESS NOTE ADULT - ASSESSMENT
good control of diabetes; pt. can be discharged on the current regimen. She is aware that outpatient insulin adjustments may be needed during the prednisone taper. Will follow as needed.

## 2018-02-12 ENCOUNTER — TRANSCRIPTION ENCOUNTER (OUTPATIENT)
Age: 68
End: 2018-02-12

## 2018-02-12 VITALS
RESPIRATION RATE: 19 BRPM | OXYGEN SATURATION: 98 % | SYSTOLIC BLOOD PRESSURE: 157 MMHG | TEMPERATURE: 98 F | DIASTOLIC BLOOD PRESSURE: 70 MMHG | HEART RATE: 74 BPM

## 2018-02-12 LAB
ANION GAP SERPL CALC-SCNC: 9 MMOL/L — SIGNIFICANT CHANGE UP (ref 5–17)
BUN SERPL-MCNC: 17 MG/DL — SIGNIFICANT CHANGE UP (ref 8–20)
CALCIUM SERPL-MCNC: 8.7 MG/DL — SIGNIFICANT CHANGE UP (ref 8.6–10.2)
CHLORIDE SERPL-SCNC: 90 MMOL/L — LOW (ref 98–107)
CO2 SERPL-SCNC: 36 MMOL/L — HIGH (ref 22–29)
CREAT SERPL-MCNC: 0.58 MG/DL — SIGNIFICANT CHANGE UP (ref 0.5–1.3)
GLUCOSE BLDC GLUCOMTR-MCNC: 117 MG/DL — HIGH (ref 70–99)
GLUCOSE BLDC GLUCOMTR-MCNC: 134 MG/DL — HIGH (ref 70–99)
GLUCOSE BLDC GLUCOMTR-MCNC: 193 MG/DL — HIGH (ref 70–99)
GLUCOSE BLDC GLUCOMTR-MCNC: 202 MG/DL — HIGH (ref 70–99)
GLUCOSE BLDC GLUCOMTR-MCNC: 51 MG/DL — LOW (ref 70–99)
GLUCOSE SERPL-MCNC: 188 MG/DL — HIGH (ref 70–115)
HCT VFR BLD CALC: 47.8 % — HIGH (ref 37–47)
HGB BLD-MCNC: 15.1 G/DL — SIGNIFICANT CHANGE UP (ref 12–16)
MAGNESIUM SERPL-MCNC: 2.1 MG/DL — SIGNIFICANT CHANGE UP (ref 1.6–2.6)
MCHC RBC-ENTMCNC: 27.9 PG — SIGNIFICANT CHANGE UP (ref 27–31)
MCHC RBC-ENTMCNC: 31.6 G/DL — LOW (ref 32–36)
MCV RBC AUTO: 88.2 FL — SIGNIFICANT CHANGE UP (ref 81–99)
PHOSPHATE SERPL-MCNC: 4.9 MG/DL — HIGH (ref 2.4–4.7)
PLATELET # BLD AUTO: 242 K/UL — SIGNIFICANT CHANGE UP (ref 150–400)
POTASSIUM SERPL-MCNC: 3.9 MMOL/L — SIGNIFICANT CHANGE UP (ref 3.5–5.3)
POTASSIUM SERPL-SCNC: 3.9 MMOL/L — SIGNIFICANT CHANGE UP (ref 3.5–5.3)
RBC # BLD: 5.42 M/UL — HIGH (ref 4.4–5.2)
RBC # FLD: 14.2 % — SIGNIFICANT CHANGE UP (ref 11–15.6)
SODIUM SERPL-SCNC: 135 MMOL/L — SIGNIFICANT CHANGE UP (ref 135–145)
WBC # BLD: 10.8 K/UL — SIGNIFICANT CHANGE UP (ref 4.8–10.8)
WBC # FLD AUTO: 10.8 K/UL — SIGNIFICANT CHANGE UP (ref 4.8–10.8)

## 2018-02-12 PROCEDURE — 82803 BLOOD GASES ANY COMBINATION: CPT

## 2018-02-12 PROCEDURE — 93306 TTE W/DOPPLER COMPLETE: CPT

## 2018-02-12 PROCEDURE — 87798 DETECT AGENT NOS DNA AMP: CPT

## 2018-02-12 PROCEDURE — 80048 BASIC METABOLIC PNL TOTAL CA: CPT

## 2018-02-12 PROCEDURE — 87633 RESP VIRUS 12-25 TARGETS: CPT

## 2018-02-12 PROCEDURE — 71275 CT ANGIOGRAPHY CHEST: CPT

## 2018-02-12 PROCEDURE — 83880 ASSAY OF NATRIURETIC PEPTIDE: CPT

## 2018-02-12 PROCEDURE — 94640 AIRWAY INHALATION TREATMENT: CPT

## 2018-02-12 PROCEDURE — 99239 HOSP IP/OBS DSCHRG MGMT >30: CPT

## 2018-02-12 PROCEDURE — 80053 COMPREHEN METABOLIC PANEL: CPT

## 2018-02-12 PROCEDURE — 85379 FIBRIN DEGRADATION QUANT: CPT

## 2018-02-12 PROCEDURE — 99232 SBSQ HOSP IP/OBS MODERATE 35: CPT

## 2018-02-12 PROCEDURE — 99285 EMERGENCY DEPT VISIT HI MDM: CPT | Mod: 25

## 2018-02-12 PROCEDURE — 94760 N-INVAS EAR/PLS OXIMETRY 1: CPT

## 2018-02-12 PROCEDURE — 36415 COLL VENOUS BLD VENIPUNCTURE: CPT

## 2018-02-12 PROCEDURE — 94660 CPAP INITIATION&MGMT: CPT

## 2018-02-12 PROCEDURE — 84484 ASSAY OF TROPONIN QUANT: CPT

## 2018-02-12 PROCEDURE — 87581 M.PNEUMON DNA AMP PROBE: CPT

## 2018-02-12 PROCEDURE — 93005 ELECTROCARDIOGRAM TRACING: CPT

## 2018-02-12 PROCEDURE — 82962 GLUCOSE BLOOD TEST: CPT

## 2018-02-12 PROCEDURE — 85027 COMPLETE CBC AUTOMATED: CPT

## 2018-02-12 PROCEDURE — 83735 ASSAY OF MAGNESIUM: CPT

## 2018-02-12 PROCEDURE — 84100 ASSAY OF PHOSPHORUS: CPT

## 2018-02-12 PROCEDURE — 96374 THER/PROPH/DIAG INJ IV PUSH: CPT | Mod: XU

## 2018-02-12 PROCEDURE — 87486 CHLMYD PNEUM DNA AMP PROBE: CPT

## 2018-02-12 RX ORDER — AMLODIPINE BESYLATE 2.5 MG/1
1 TABLET ORAL
Qty: 30 | Refills: 0 | OUTPATIENT
Start: 2018-02-12 | End: 2018-03-13

## 2018-02-12 RX ORDER — INSULIN ASPART 100 [IU]/ML
0 INJECTION, SOLUTION SUBCUTANEOUS
Qty: 0 | Refills: 0 | COMMUNITY

## 2018-02-12 RX ORDER — METFORMIN HYDROCHLORIDE 850 MG/1
1 TABLET ORAL
Qty: 0 | Refills: 0 | COMMUNITY

## 2018-02-12 RX ORDER — LISINOPRIL 2.5 MG/1
1 TABLET ORAL
Qty: 0 | Refills: 0 | COMMUNITY

## 2018-02-12 RX ORDER — LOSARTAN/HYDROCHLOROTHIAZIDE 100MG-25MG
1 TABLET ORAL
Qty: 0 | Refills: 0 | COMMUNITY

## 2018-02-12 RX ORDER — IPRATROPIUM/ALBUTEROL SULFATE 18-103MCG
3 AEROSOL WITH ADAPTER (GRAM) INHALATION
Qty: 0 | Refills: 0 | COMMUNITY
Start: 2018-02-12

## 2018-02-12 RX ORDER — CARVEDILOL PHOSPHATE 80 MG/1
2 CAPSULE, EXTENDED RELEASE ORAL
Qty: 120 | Refills: 0 | OUTPATIENT
Start: 2018-02-12 | End: 2018-03-13

## 2018-02-12 RX ORDER — LISINOPRIL 2.5 MG/1
10 TABLET ORAL DAILY
Qty: 0 | Refills: 0 | Status: DISCONTINUED | OUTPATIENT
Start: 2018-02-12 | End: 2018-02-12

## 2018-02-12 RX ORDER — DEXTROSE 50 % IN WATER 50 %
1 SYRINGE (ML) INTRAVENOUS ONCE
Qty: 0 | Refills: 0 | Status: DISCONTINUED | OUTPATIENT
Start: 2018-02-12 | End: 2018-02-12

## 2018-02-12 RX ORDER — HYDRALAZINE HCL 50 MG
1 TABLET ORAL
Qty: 90 | Refills: 0 | OUTPATIENT
Start: 2018-02-12 | End: 2018-03-13

## 2018-02-12 RX ORDER — ASPIRIN/CALCIUM CARB/MAGNESIUM 324 MG
1 TABLET ORAL
Qty: 0 | Refills: 0 | COMMUNITY

## 2018-02-12 RX ORDER — CYCLOSPORINE 0.5 MG/ML
1 EMULSION OPHTHALMIC
Qty: 0 | Refills: 0 | COMMUNITY

## 2018-02-12 RX ORDER — INSULIN DETEMIR 100/ML (3)
62 INSULIN PEN (ML) SUBCUTANEOUS
Qty: 0 | Refills: 0 | COMMUNITY

## 2018-02-12 RX ORDER — SIMVASTATIN 20 MG/1
1 TABLET, FILM COATED ORAL
Qty: 0 | Refills: 0 | COMMUNITY

## 2018-02-12 RX ORDER — BUDESONIDE AND FORMOTEROL FUMARATE DIHYDRATE 160; 4.5 UG/1; UG/1
2 AEROSOL RESPIRATORY (INHALATION)
Qty: 60 | Refills: 0 | OUTPATIENT
Start: 2018-02-12 | End: 2018-03-13

## 2018-02-12 RX ORDER — ALBUTEROL 90 UG/1
2 AEROSOL, METERED ORAL
Qty: 0 | Refills: 0 | COMMUNITY

## 2018-02-12 RX ORDER — METFORMIN HYDROCHLORIDE 850 MG/1
1000 TABLET ORAL
Qty: 0 | Refills: 0 | Status: DISCONTINUED | OUTPATIENT
Start: 2018-02-12 | End: 2018-02-12

## 2018-02-12 RX ORDER — POLYETHYLENE GLYCOL 3350 17 G/17G
17 POWDER, FOR SOLUTION ORAL
Qty: 0 | Refills: 0 | COMMUNITY
Start: 2018-02-12

## 2018-02-12 RX ORDER — AMLODIPINE BESYLATE 2.5 MG/1
1 TABLET ORAL
Qty: 0 | Refills: 0 | COMMUNITY

## 2018-02-12 RX ORDER — METFORMIN HYDROCHLORIDE 850 MG/1
1 TABLET ORAL
Qty: 60 | Refills: 0 | OUTPATIENT
Start: 2018-02-12 | End: 2018-03-13

## 2018-02-12 RX ORDER — FLUOXETINE HCL 10 MG
3 CAPSULE ORAL
Qty: 0 | Refills: 0 | COMMUNITY

## 2018-02-12 RX ORDER — ENOXAPARIN SODIUM 100 MG/ML
60 INJECTION SUBCUTANEOUS
Qty: 5 | Refills: 0 | OUTPATIENT
Start: 2018-02-12 | End: 2018-03-13

## 2018-02-12 RX ORDER — ENOXAPARIN SODIUM 100 MG/ML
60 INJECTION SUBCUTANEOUS
Qty: 5 | Refills: 0 | OUTPATIENT
Start: 2018-02-12

## 2018-02-12 RX ORDER — ESCITALOPRAM OXALATE 10 MG/1
1 TABLET, FILM COATED ORAL
Qty: 0 | Refills: 0 | COMMUNITY

## 2018-02-12 RX ORDER — FLUTICASONE PROPIONATE AND SALMETEROL 50; 250 UG/1; UG/1
1 POWDER ORAL; RESPIRATORY (INHALATION)
Qty: 0 | Refills: 0 | COMMUNITY

## 2018-02-12 RX ORDER — ESCITALOPRAM OXALATE 10 MG/1
10 TABLET, FILM COATED ORAL DAILY
Qty: 0 | Refills: 0 | Status: DISCONTINUED | OUTPATIENT
Start: 2018-02-12 | End: 2018-02-12

## 2018-02-12 RX ORDER — CARVEDILOL PHOSPHATE 80 MG/1
1 CAPSULE, EXTENDED RELEASE ORAL
Qty: 0 | Refills: 0 | COMMUNITY

## 2018-02-12 RX ORDER — ALPRAZOLAM 0.25 MG
1 TABLET ORAL
Qty: 0 | Refills: 0 | COMMUNITY

## 2018-02-12 RX ORDER — ZOLPIDEM TARTRATE 10 MG/1
1 TABLET ORAL
Qty: 0 | Refills: 0 | COMMUNITY

## 2018-02-12 RX ORDER — DOCUSATE SODIUM 100 MG
1 CAPSULE ORAL
Qty: 0 | Refills: 0 | COMMUNITY
Start: 2018-02-12

## 2018-02-12 RX ORDER — ENOXAPARIN SODIUM 100 MG/ML
50 INJECTION SUBCUTANEOUS
Qty: 5 | Refills: 0 | OUTPATIENT
Start: 2018-02-12

## 2018-02-12 RX ADMIN — LOSARTAN POTASSIUM 100 MILLIGRAM(S): 100 TABLET, FILM COATED ORAL at 07:52

## 2018-02-12 RX ADMIN — Medication 50 MILLIGRAM(S): at 07:51

## 2018-02-12 RX ADMIN — AMLODIPINE BESYLATE 10 MILLIGRAM(S): 2.5 TABLET ORAL at 07:51

## 2018-02-12 RX ADMIN — Medication 20 UNIT(S): at 14:07

## 2018-02-12 RX ADMIN — Medication 3 MILLILITER(S): at 08:59

## 2018-02-12 RX ADMIN — Medication 3 MILLILITER(S): at 14:48

## 2018-02-12 RX ADMIN — BUDESONIDE AND FORMOTEROL FUMARATE DIHYDRATE 2 PUFF(S): 160; 4.5 AEROSOL RESPIRATORY (INHALATION) at 09:01

## 2018-02-12 RX ADMIN — Medication 0.5 MILLIGRAM(S): at 14:17

## 2018-02-12 RX ADMIN — CARVEDILOL PHOSPHATE 12.5 MILLIGRAM(S): 80 CAPSULE, EXTENDED RELEASE ORAL at 07:51

## 2018-02-12 RX ADMIN — Medication 60 MILLIGRAM(S): at 14:10

## 2018-02-12 RX ADMIN — LISINOPRIL 10 MILLIGRAM(S): 2.5 TABLET ORAL at 14:08

## 2018-02-12 RX ADMIN — INSULIN GLARGINE 60 UNIT(S): 100 INJECTION, SOLUTION SUBCUTANEOUS at 09:24

## 2018-02-12 RX ADMIN — CARVEDILOL PHOSPHATE 12.5 MILLIGRAM(S): 80 CAPSULE, EXTENDED RELEASE ORAL at 17:00

## 2018-02-12 RX ADMIN — Medication 40 MILLIGRAM(S): at 07:52

## 2018-02-12 RX ADMIN — Medication 8: at 14:07

## 2018-02-12 RX ADMIN — Medication 50 MILLIGRAM(S): at 14:18

## 2018-02-12 RX ADMIN — Medication 20 UNIT(S): at 16:57

## 2018-02-12 RX ADMIN — Medication 25 MILLIGRAM(S): at 07:51

## 2018-02-12 RX ADMIN — ENOXAPARIN SODIUM 40 MILLIGRAM(S): 100 INJECTION SUBCUTANEOUS at 14:09

## 2018-02-12 RX ADMIN — Medication 20 UNIT(S): at 09:23

## 2018-02-12 RX ADMIN — METFORMIN HYDROCHLORIDE 1000 MILLIGRAM(S): 850 TABLET ORAL at 16:58

## 2018-02-12 RX ADMIN — Medication 4: at 16:57

## 2018-02-12 NOTE — PROGRESS NOTE ADULT - PROVIDER SPECIALTY LIST ADULT
Endocrinology
Hospitalist
Pulmonology
Endocrinology
Hospitalist
Pulmonology

## 2018-02-12 NOTE — DISCHARGE NOTE ADULT - PLAN OF CARE
Please take all meds and f/u with pulmonology Please finish up the prednisone taper and f/u with outpatient PMD and pulmonology

## 2018-02-12 NOTE — DISCHARGE NOTE ADULT - HOSPITAL COURSE
is a 68 yo female with a pmhx of DMII on insulin, HTN, HLD, active smoker as of three days ago quit, smoked half a pack a day for >20yrs, who presents today with a h/o sob, cough, weakness, malaise since friday afternoon. Pt reports sx have been getting progressively worse, prompting visit to PMD which sent her to ED due to hypoxia with PO2 in 80's. In ED pt found to have +influenza. Pt does not use home O2. Reports sick contacts at work. NO cp, chills, fever, sob, phlegm production, palpitation, n/v/d.     She does not have a diagnosis of COPD, but given her symptoms of wheezing, rhonchi, and now oxygen dependence and bipap dependence, we treated her for an exacerbation and she is going home on oxygen. She's going to f/u with pulm for eventual outpatient PFTs.

## 2018-02-12 NOTE — DISCHARGE NOTE ADULT - CARE PLAN
Principal Discharge DX:	COPD exacerbation  Goal:	Please take all meds and f/u with pulmonology  Assessment and plan of treatment:	Please finish up the prednisone taper and f/u with outpatient PMD and pulmonology  Secondary Diagnosis:	Flu

## 2018-02-12 NOTE — DISCHARGE NOTE ADULT - MEDICATION SUMMARY - MEDICATIONS TO TAKE
I will START or STAY ON the medications listed below when I get home from the hospital:    predniSONE 20 mg oral tablet  -- 4 tab(s) by mouth once a day  x3 days 3 tabs by mouth once a day x3 days 2 tabs by mouth once a day x3 days 1 tab by mouth once a day x3 days MDD:4 tabs  -- Indication: For COPD exacerbation    Aspir 81 oral delayed release tablet  -- 1 tab(s) by mouth once a day  -- Indication: For prophylaxis    lisinopril 10 mg oral tablet  -- 1 tab(s) by mouth once a day  -- Indication: For HTN (hypertension)    escitalopram 10 mg oral tablet  -- 1 tab(s) by mouth once a day  -- Indication: For Depression    FLUoxetine 20 mg oral capsule  -- 3 cap(s) by mouth once a day  -- Indication: For Depression    Levemir 100 units/mL subcutaneous solution  -- 62 unit(s) subcutaneous 2 times a day  -- Indication: For Diabetes    metFORMIN 1000 mg oral tablet  -- 1 tab(s) by mouth 2 times a day  -- Indication: For Diabetes    NovoLOG 100 units/mL injectable solution  -- 17-30 unit(s) injectable 3 times a day (with meals)  -- Indication: For Diabetes    simvastatin 20 mg oral tablet  -- 1 tab(s) by mouth once a day (at bedtime)  -- Indication: For Hyperlipidemia    Ambien 10 mg oral tablet  -- 1 tab(s) by mouth once a day (at bedtime)  -- Indication: For Inosmnia    ALPRAZolam 0.5 mg oral tablet  -- 1 tab(s) by mouth 2 times a day, As Needed  -- Indication: For Anxiety and depression    carvedilol 6.25 mg oral tablet  -- 1 tab(s) by mouth 2 times a day  -- Indication: For HTN (hypertension)    Advair Diskus 100 mcg-50 mcg inhalation powder  -- 1 puff(s) inhaled 2 times a day  -- Indication: For COPD exacerbation    budesonide-formoterol 160 mcg-4.5 mcg/inh inhalation aerosol  -- 2 puff(s) inhaled 2 times a day MDD:4 puffs  -- Indication: For COPD exacerbation    ipratropium-albuterol 0.5 mg-2.5 mg/3 mLinhalation solution  -- 3 milliliter(s) inhaled every 6 hours  -- Indication: For COPD exacerbation    amLODIPine 5 mg oral tablet  -- 1 tab(s) by mouth once a day MDD:1 tab  -- Indication: For HTN (hypertension)    docusate sodium 100 mg oral capsule  -- 1 cap(s) by mouth 2 times a day  -- Indication: For COnstipation    polyethylene glycol 3350 oral powder for reconstitution  -- 17 gram(s) by mouth once a day  -- Indication: For COnstipation    Restasis 0.05% ophthalmic emulsion  -- 1 drop(s) to each affected eye every 12 hours  -- Indication: For glaucoma    hydrALAZINE 50 mg oral tablet  -- 1 tab(s) by mouth every 8 hours MDD:3 tabs  -- Indication: For HTN (hypertension)

## 2018-02-12 NOTE — DISCHARGE NOTE ADULT - CARE PROVIDERS DIRECT ADDRESSES
,DirectAddress_Unknown,[email or web address]

## 2018-02-12 NOTE — PROGRESS NOTE ADULT - ASSESSMENT
67 y/ o female with PMHx of HTN, HLD, DM-II, depression, insomnia, active smoker, presumed JUSTINA, presented to ED with SOB, cough, headache, malaise and noted to have positive influenza and wheezing on exam, admitted for respiratory distress with hypoxia due to presumed COPD exacerbation given smoking h/o in setting of Influenza B infection.     Plan:    Acute Respiratory failure with hypoxia:   - Likely due to presumed COPD exacerbation (undiagnosed) with +influenza  - C/w Nebs q 6hrs, while awake, solumedrol 40  q 12 hrs for goal >92%, albuterol neb prn. Taper steroid as per clinical response.  - Pulmonology input appreciated, as per patient has nebulizer at home but does not work.  -Patient pulse ox on room air at rest is 87%, the patient will require continuous home oxygen at 3 liter nasal canula.   - Pulse ox on room air with ambulation is 85%, will need home oxygen.    Flu Influenza B positive:  supportive care, Tamiflu completed  Lymphadenopathy.    - Paratracheal in setting of smoking history, likely reactive to acute infection however will need to outpatient f/u with yearly low dose CT scan with PMD.     Smoker.    cessation counseling provided  refused nicotine patch.     High cholesterol  -cont zocor  -DASH/TLC diet.     Essential hypertension.    cont losartan / amlodipine / coreg, HCTZ. Hydralazine added.   Daughter brought in medication list, on 2 BB and ACE and ARB    Type 2 diabetes mellitus:   Pt on Levemir 62 bid and high sliding scale at home.   BG uncontrolled, A1C 9+ BG elevated due to steroids, also food brought from home to patient. Episode of hypoglycemia at 2 am thuis morning. PM dose of Lantus decrease to 50 units, continue Lantus 60 units in am. Will discharge home on 17 units premeal TID.   Will need follow up with Endocrinologist as outpatient    Depression/Anxiety:  As per daughter has not seen Psychiatrist or therapist, on 2 SSRI's? Psych input appreciated- d/c lexapro and continue with prozac.    H/o CAD:  Echo: EF normal with Grade I diastolic dysfunction    JUSTINA:  Patient has prior diagnosis, was non compliant with use. On nasal canula 3 liters patient oxygen saturation was 83% while sleeping, improved with use of BIPAP. Will require BIPAP on discharge. Follow up with Pulmonology as outpatient 67 y/ o female with PMHx of HTN, HLD, DM-II, depression, insomnia, active smoker, presumed JUSTINA, presented to ED with SOB, cough, headache, malaise and noted to have positive influenza and wheezing on exam, admitted for respiratory distress with hypoxia due to presumed COPD exacerbation given smoking h/o in setting of Influenza B infection.     Plan:    Acute Respiratory failure with hypoxia:   - Likely due to presumed COPD exacerbation (undiagnosed) with +influenza  - C/w Nebs q 6hrs, while awake, solumedrol 40  q 12 hrs for goal >92%, albuterol neb prn. Taper steroid as per clinical response.  - Pulmonology input appreciated, as per patient has nebulizer at home but does not work.  - Patient is hypoxic secondary to COPD, therefor will require home oxygen.   -Patient pulse ox on room air at rest is 87%, the patient will require continuous home oxygen at 3 liter nasal canula. retaken on 4 liters and oxygen saturation 96%.  - Pulse ox on room air with ambulation is 85%, will need home oxygen.     Flu Influenza B positive:  supportive care, Tamiflu completed  Lymphadenopathy.    - Paratracheal in setting of smoking history, likely reactive to acute infection however will need to outpatient f/u with yearly low dose CT scan with PMD.     Smoker.    cessation counseling provided  refused nicotine patch.     High cholesterol  -cont zocor  -DASH/TLC diet.     Essential hypertension.    cont losartan / amlodipine / coreg, HCTZ. Hydralazine added.   Daughter brought in medication list, on 2 BB and ACE and ARB    Type 2 diabetes mellitus:   Pt on Levemir 62 bid and high sliding scale at home.   BG uncontrolled, A1C 9+ BG elevated due to steroids, also food brought from home to patient. Episode of hypoglycemia at 2 am thuis morning. PM dose of Lantus decrease to 50 units, continue Lantus 60 units in am. Will discharge home on 17 units premeal TID.   Will need follow up with Endocrinologist as outpatient    Depression/Anxiety:  As per daughter has not seen Psychiatrist or therapist, on 2 SSRI's? Psych input appreciated- d/c lexapro and continue with prozac.    H/o CAD:  Echo: EF normal with Grade I diastolic dysfunction    UJSTINA:  Patient has prior diagnosis, was non compliant with use. On nasal canula 3 liters patient oxygen saturation was 83% while sleeping, improved with use of BIPAP. Will require BIPAP on discharge. Follow up with Pulmonology as outpatient

## 2018-02-12 NOTE — DISCHARGE NOTE ADULT - PATIENT PORTAL LINK FT
You can access the [facility] Patient Portal, offered by [facility], by registering with the following website: [email or web address]

## 2018-02-12 NOTE — DISCHARGE NOTE ADULT - MEDICATION SUMMARY - MEDICATIONS TO STOP TAKING
I will STOP taking the medications listed below when I get home from the hospital:    losartan-hydroCHLOROthiazide 100 mg-25 mg oral tablet  -- 1 tab(s) by mouth once a day    Levemir 100 units/mL subcutaneous solution  -- 62 unit(s) subcutaneous 2 times a day
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
## 2018-02-12 NOTE — PROGRESS NOTE ADULT - ASSESSMENT
Assess    Post influenza with bronchospasm  Obese  Hypercarbia developed in hospital (came in with serum CO2=30)  Suggestion of anatomic COPD - unclear functional component  Possible JUSTINA  Doubt patient with qualify for OP Bipap    Rec    Oral sleroid taper  LABA/ICS and LAMA (Symbicort and spiriva or equiv)  OP pulmonary FU for PFT and PST  02 if needed  Nocturnal Bipap if possible - (ie insurance covers) otherwise not   Truncal elevation for sleep

## 2018-02-12 NOTE — DISCHARGE NOTE ADULT - CARE PROVIDER_API CALL
Tila Ascencio), Medicine  [address] [place] [id number]  Phone: [phone]  Fax: [phone]    Shreyas Tim), Internal Medicine; Pulmonary Disease  Pulmonary Medicine at [address]  [place] [address]  Phone: [phone]  Fax: [phone]

## 2018-03-19 PROBLEM — E11.9 TYPE 2 DIABETES MELLITUS WITHOUT COMPLICATIONS: Chronic | Status: ACTIVE | Noted: 2018-02-05

## 2018-03-19 PROBLEM — F17.200 NICOTINE DEPENDENCE, UNSPECIFIED, UNCOMPLICATED: Chronic | Status: ACTIVE | Noted: 2018-02-05

## 2018-03-19 PROBLEM — I10 ESSENTIAL (PRIMARY) HYPERTENSION: Chronic | Status: ACTIVE | Noted: 2018-02-05

## 2018-03-19 PROBLEM — E78.00 PURE HYPERCHOLESTEROLEMIA, UNSPECIFIED: Chronic | Status: ACTIVE | Noted: 2018-02-05

## 2018-03-23 ENCOUNTER — APPOINTMENT (OUTPATIENT)
Dept: PULMONOLOGY | Facility: CLINIC | Age: 68
End: 2018-03-23
Payer: MEDICARE

## 2018-03-23 ENCOUNTER — NON-APPOINTMENT (OUTPATIENT)
Age: 68
End: 2018-03-23

## 2018-03-23 VITALS
RESPIRATION RATE: 17 BRPM | BODY MASS INDEX: 45.98 KG/M2 | SYSTOLIC BLOOD PRESSURE: 140 MMHG | DIASTOLIC BLOOD PRESSURE: 79 MMHG | TEMPERATURE: 98.3 F | OXYGEN SATURATION: 95 % | WEIGHT: 276 LBS | HEIGHT: 65 IN

## 2018-03-23 DIAGNOSIS — Z86.39 PERSONAL HISTORY OF OTHER ENDOCRINE, NUTRITIONAL AND METABOLIC DISEASE: ICD-10-CM

## 2018-03-23 DIAGNOSIS — Z86.79 PERSONAL HISTORY OF OTHER DISEASES OF THE CIRCULATORY SYSTEM: ICD-10-CM

## 2018-03-23 PROCEDURE — 99204 OFFICE O/P NEW MOD 45 MIN: CPT | Mod: 25

## 2018-03-23 PROCEDURE — 99214 OFFICE O/P EST MOD 30 MIN: CPT

## 2018-03-23 PROCEDURE — 94010 BREATHING CAPACITY TEST: CPT

## 2018-03-23 RX ORDER — FLUTICASONE PROPIONATE AND SALMETEROL 50; 250 UG/1; UG/1
250-50 POWDER RESPIRATORY (INHALATION)
Qty: 1 | Refills: 6 | Status: ACTIVE | COMMUNITY
Start: 2018-03-23 | End: 1900-01-01

## 2018-03-23 RX ORDER — ALBUTEROL SULFATE 90 UG/1
108 (90 BASE) AEROSOL, METERED RESPIRATORY (INHALATION)
Qty: 1 | Refills: 4 | Status: ACTIVE | COMMUNITY
Start: 2018-03-23 | End: 1900-01-01

## 2018-03-23 RX ORDER — ALBUTEROL SULFATE 2.5 MG/3ML
(2.5 MG/3ML) SOLUTION RESPIRATORY (INHALATION)
Qty: 3 | Refills: 3 | Status: ACTIVE | COMMUNITY
Start: 2018-03-23 | End: 1900-01-01

## 2018-03-27 ENCOUNTER — NON-APPOINTMENT (OUTPATIENT)
Age: 68
End: 2018-03-27

## 2018-03-27 ENCOUNTER — APPOINTMENT (OUTPATIENT)
Dept: CARDIOLOGY | Facility: CLINIC | Age: 68
End: 2018-03-27
Payer: MEDICARE

## 2018-03-27 VITALS
SYSTOLIC BLOOD PRESSURE: 146 MMHG | DIASTOLIC BLOOD PRESSURE: 71 MMHG | BODY MASS INDEX: 45.98 KG/M2 | HEIGHT: 65 IN | HEART RATE: 73 BPM | WEIGHT: 276 LBS | OXYGEN SATURATION: 90 %

## 2018-03-27 PROCEDURE — 99204 OFFICE O/P NEW MOD 45 MIN: CPT | Mod: 25

## 2018-03-27 PROCEDURE — 93000 ELECTROCARDIOGRAM COMPLETE: CPT

## 2018-03-27 RX ORDER — ZOLPIDEM TARTRATE 10 MG/1
10 TABLET ORAL
Qty: 30 | Refills: 0 | Status: ACTIVE | COMMUNITY
Start: 2017-11-20

## 2018-03-27 RX ORDER — FLUOXETINE HYDROCHLORIDE 20 MG/1
20 TABLET ORAL EVERY MORNING
Refills: 0 | Status: ACTIVE | COMMUNITY
Start: 2018-03-27

## 2018-03-27 RX ORDER — INSULIN DETEMIR 100 [IU]/ML
100 INJECTION, SOLUTION SUBCUTANEOUS
Qty: 110 | Refills: 0 | Status: ACTIVE | COMMUNITY
Start: 2017-11-22

## 2018-03-27 RX ORDER — LOSARTAN POTASSIUM AND HYDROCHLOROTHIAZIDE 25; 100 MG/1; MG/1
100-25 TABLET ORAL
Qty: 90 | Refills: 0 | Status: ACTIVE | COMMUNITY
Start: 2017-11-20

## 2018-03-27 RX ORDER — LISINOPRIL 10 MG/1
10 TABLET ORAL
Qty: 90 | Refills: 0 | Status: DISCONTINUED | COMMUNITY
Start: 2017-11-20 | End: 2018-03-27

## 2018-03-27 RX ORDER — METFORMIN HYDROCHLORIDE 1000 MG/1
1000 TABLET, COATED ORAL
Qty: 180 | Refills: 0 | Status: ACTIVE | COMMUNITY
Start: 2017-11-20

## 2018-03-27 RX ORDER — AMLODIPINE BESYLATE 5 MG/1
5 TABLET ORAL
Qty: 90 | Refills: 0 | Status: ACTIVE | COMMUNITY
Start: 2017-11-20

## 2018-03-27 RX ORDER — CARVEDILOL 6.25 MG/1
6.25 TABLET, FILM COATED ORAL
Qty: 180 | Refills: 0 | Status: ACTIVE | COMMUNITY
Start: 2017-11-21

## 2018-03-27 RX ORDER — CYCLOSPORINE 0.5 MG/ML
0.05 EMULSION OPHTHALMIC
Qty: 16 | Refills: 0 | Status: ACTIVE | COMMUNITY
Start: 2018-02-19

## 2018-03-27 RX ORDER — SIMVASTATIN 20 MG/1
20 TABLET, FILM COATED ORAL
Qty: 90 | Refills: 0 | Status: ACTIVE | COMMUNITY
Start: 2017-11-20

## 2018-03-27 RX ORDER — ALPRAZOLAM 0.5 MG/1
0.5 TABLET ORAL
Qty: 60 | Refills: 0 | Status: ACTIVE | COMMUNITY
Start: 2017-11-20

## 2018-03-30 ENCOUNTER — APPOINTMENT (OUTPATIENT)
Dept: CARDIOLOGY | Facility: CLINIC | Age: 68
End: 2018-03-30
Payer: MEDICARE

## 2018-03-30 PROCEDURE — 93970 EXTREMITY STUDY: CPT

## 2018-04-24 ENCOUNTER — APPOINTMENT (OUTPATIENT)
Dept: CARDIOLOGY | Facility: CLINIC | Age: 68
End: 2018-04-24

## 2018-04-26 ENCOUNTER — APPOINTMENT (OUTPATIENT)
Dept: CARDIOLOGY | Facility: CLINIC | Age: 68
End: 2018-04-26
Payer: MEDICARE

## 2018-04-26 PROCEDURE — A9500: CPT

## 2018-04-26 PROCEDURE — 78452 HT MUSCLE IMAGE SPECT MULT: CPT

## 2018-04-26 PROCEDURE — 93015 CV STRESS TEST SUPVJ I&R: CPT

## 2018-04-27 ENCOUNTER — APPOINTMENT (OUTPATIENT)
Dept: CARDIOLOGY | Facility: CLINIC | Age: 68
End: 2018-04-27

## 2018-05-01 ENCOUNTER — APPOINTMENT (OUTPATIENT)
Dept: CARDIOLOGY | Facility: CLINIC | Age: 68
End: 2018-05-01
Payer: MEDICARE

## 2018-05-01 PROCEDURE — 93015 CV STRESS TEST SUPVJ I&R: CPT

## 2018-05-01 PROCEDURE — A9500: CPT

## 2018-05-01 PROCEDURE — 78452 HT MUSCLE IMAGE SPECT MULT: CPT

## 2018-05-02 ENCOUNTER — APPOINTMENT (OUTPATIENT)
Dept: CARDIOLOGY | Facility: CLINIC | Age: 68
End: 2018-05-02
Payer: MEDICARE

## 2018-05-02 VITALS
OXYGEN SATURATION: 84 % | HEART RATE: 79 BPM | BODY MASS INDEX: 44.98 KG/M2 | SYSTOLIC BLOOD PRESSURE: 130 MMHG | DIASTOLIC BLOOD PRESSURE: 70 MMHG | WEIGHT: 270 LBS | HEIGHT: 65 IN

## 2018-05-02 DIAGNOSIS — I10 ESSENTIAL (PRIMARY) HYPERTENSION: ICD-10-CM

## 2018-05-02 DIAGNOSIS — I05.0 RHEUMATIC MITRAL STENOSIS: ICD-10-CM

## 2018-05-02 DIAGNOSIS — Z87.891 PERSONAL HISTORY OF NICOTINE DEPENDENCE: ICD-10-CM

## 2018-05-02 DIAGNOSIS — R06.09 OTHER FORMS OF DYSPNEA: ICD-10-CM

## 2018-05-02 DIAGNOSIS — R09.89 OTHER SPECIFIED SYMPTOMS AND SIGNS INVOLVING THE CIRCULATORY AND RESPIRATORY SYSTEMS: ICD-10-CM

## 2018-05-02 PROCEDURE — 99214 OFFICE O/P EST MOD 30 MIN: CPT

## 2018-05-02 PROCEDURE — 93880 EXTRACRANIAL BILAT STUDY: CPT

## 2018-05-02 RX ORDER — ASPIRIN ENTERIC COATED TABLETS 81 MG 81 MG/1
81 TABLET, DELAYED RELEASE ORAL
Refills: 0 | Status: ACTIVE | COMMUNITY
Start: 2018-05-02

## 2018-05-25 ENCOUNTER — APPOINTMENT (OUTPATIENT)
Dept: PULMONOLOGY | Facility: CLINIC | Age: 68
End: 2018-05-25
Payer: MEDICARE

## 2018-05-25 VITALS
OXYGEN SATURATION: 91 % | DIASTOLIC BLOOD PRESSURE: 80 MMHG | HEART RATE: 83 BPM | SYSTOLIC BLOOD PRESSURE: 130 MMHG | RESPIRATION RATE: 20 BRPM

## 2018-05-25 DIAGNOSIS — E66.01 MORBID (SEVERE) OBESITY DUE TO EXCESS CALORIES: ICD-10-CM

## 2018-05-25 DIAGNOSIS — J44.9 CHRONIC OBSTRUCTIVE PULMONARY DISEASE, UNSPECIFIED: ICD-10-CM

## 2018-05-25 DIAGNOSIS — Z87.09 PERSONAL HISTORY OF OTHER DISEASES OF THE RESPIRATORY SYSTEM: ICD-10-CM

## 2018-05-25 PROCEDURE — 99214 OFFICE O/P EST MOD 30 MIN: CPT

## 2018-05-25 RX ORDER — AZITHROMYCIN 250 MG/1
250 TABLET, FILM COATED ORAL
Qty: 6 | Refills: 0 | Status: ACTIVE | COMMUNITY
Start: 2018-05-25 | End: 1900-01-01

## 2018-08-24 ENCOUNTER — APPOINTMENT (OUTPATIENT)
Dept: PULMONOLOGY | Facility: CLINIC | Age: 68
End: 2018-08-24

## 2018-08-31 ENCOUNTER — RECORD ABSTRACTING (OUTPATIENT)
Age: 68
End: 2018-08-31

## 2018-08-31 DIAGNOSIS — Z78.9 OTHER SPECIFIED HEALTH STATUS: ICD-10-CM

## 2018-08-31 DIAGNOSIS — Z83.49 FAMILY HISTORY OF OTHER ENDOCRINE, NUTRITIONAL AND METABOLIC DISEASES: ICD-10-CM

## 2018-08-31 DIAGNOSIS — Z86.59 PERSONAL HISTORY OF OTHER MENTAL AND BEHAVIORAL DISORDERS: ICD-10-CM

## 2018-08-31 DIAGNOSIS — E11.9 TYPE 2 DIABETES MELLITUS W/OUT COMPLICATIONS: ICD-10-CM

## 2018-08-31 DIAGNOSIS — Z83.3 FAMILY HISTORY OF DIABETES MELLITUS: ICD-10-CM

## 2018-08-31 DIAGNOSIS — Z82.49 FAMILY HISTORY OF ISCHEMIC HEART DISEASE AND OTHER DISEASES OF THE CIRCULATORY SYSTEM: ICD-10-CM

## 2018-08-31 DIAGNOSIS — E78.5 HYPERLIPIDEMIA, UNSPECIFIED: ICD-10-CM

## 2018-08-31 DIAGNOSIS — I25.2 OLD MYOCARDIAL INFARCTION: ICD-10-CM

## 2018-08-31 LAB
HBA1C MFR BLD HPLC: 8.4
HBA1C MFR BLD: 8.4
HBA1C MFR BLD: 8.4

## 2018-09-13 ENCOUNTER — RECORD ABSTRACTING (OUTPATIENT)
Age: 68
End: 2018-09-13

## 2018-09-14 ENCOUNTER — APPOINTMENT (OUTPATIENT)
Dept: ENDOCRINOLOGY | Facility: CLINIC | Age: 68
End: 2018-09-14

## 2018-11-02 ENCOUNTER — APPOINTMENT (OUTPATIENT)
Dept: CARDIOLOGY | Facility: CLINIC | Age: 68
End: 2018-11-02

## 2020-12-16 PROBLEM — Z87.09 HISTORY OF ACUTE BRONCHITIS: Status: RESOLVED | Noted: 2018-05-25 | Resolved: 2020-12-16

## 2021-10-06 PROBLEM — I10 ESSENTIAL HYPERTENSION: Status: ACTIVE | Noted: 2018-03-27

## 2023-06-15 NOTE — DIETITIAN INITIAL EVALUATION ADULT. - DOB: +DATEOFBIRTH
No actute findings on exam today.  Follow-up lab ordered.  Immunizations up-to-date.  6-month follow-up and as needed.  Call sooner with questions or concerns.   Statement Selected

## 2024-01-17 NOTE — PROGRESS NOTE ADULT - PROBLEM SELECTOR PLAN 3
Paratracheal in setting of smoking history, likely reactive to acute infection however will need to outpatient f/u with yearly low dose CT scan with PMD Statement Selected

## 2024-06-11 NOTE — BEHAVIORAL HEALTH ASSESSMENT NOTE - NS ED [facility] MSE SPEECH SPONTANEITY
LOV:  5/17/24  NOV:  8/5/24    Dx:  ILD    Per LOV:  \"Plan - Decrease Prednisone by 10 mg every 4 weeks - continue Bactrim while on 20 mg or more - f/u in 12 weeks from now - sooner if any symptoms - d/w patient and daughter safety issues with Prednisone     Pt started prednisone 40mg on 4/28/24 & is to go down to 30mg on 6/23/24.  However, pt. Has had weight gain & facial swelling.  Pt. Does not wish to continue prednison, calling to see how to stop & alternative therapy.    Message routed to provider   Normal